# Patient Record
Sex: FEMALE | Race: OTHER | HISPANIC OR LATINO
[De-identification: names, ages, dates, MRNs, and addresses within clinical notes are randomized per-mention and may not be internally consistent; named-entity substitution may affect disease eponyms.]

---

## 2018-05-30 PROBLEM — Z00.00 ENCOUNTER FOR PREVENTIVE HEALTH EXAMINATION: Status: ACTIVE | Noted: 2018-05-30

## 2018-06-06 ENCOUNTER — LABORATORY RESULT (OUTPATIENT)
Age: 33
End: 2018-06-06

## 2018-06-07 ENCOUNTER — APPOINTMENT (OUTPATIENT)
Dept: OBGYN | Facility: CLINIC | Age: 33
End: 2018-06-07
Payer: COMMERCIAL

## 2018-06-07 VITALS
SYSTOLIC BLOOD PRESSURE: 110 MMHG | BODY MASS INDEX: 19.49 KG/M2 | WEIGHT: 110 LBS | HEIGHT: 63 IN | DIASTOLIC BLOOD PRESSURE: 80 MMHG

## 2018-06-07 DIAGNOSIS — Z30.09 ENCOUNTER FOR OTHER GENERAL COUNSELING AND ADVICE ON CONTRACEPTION: ICD-10-CM

## 2018-06-07 PROCEDURE — 99385 PREV VISIT NEW AGE 18-39: CPT

## 2018-06-07 RX ORDER — NORGESTIMATE AND ETHINYL ESTRADIOL 7DAYSX3 LO
0.18/0.215/0.25 KIT ORAL DAILY
Qty: 30 | Refills: 11 | Status: ACTIVE | COMMUNITY
Start: 2018-06-07 | End: 1900-01-01

## 2018-07-17 ENCOUNTER — ASOB RESULT (OUTPATIENT)
Age: 33
End: 2018-07-17

## 2018-07-17 ENCOUNTER — APPOINTMENT (OUTPATIENT)
Dept: OBGYN | Facility: CLINIC | Age: 33
End: 2018-07-17
Payer: COMMERCIAL

## 2018-07-17 ENCOUNTER — NON-APPOINTMENT (OUTPATIENT)
Age: 33
End: 2018-07-17

## 2018-07-17 VITALS — SYSTOLIC BLOOD PRESSURE: 100 MMHG | BODY MASS INDEX: 19.49 KG/M2 | WEIGHT: 110 LBS | DIASTOLIC BLOOD PRESSURE: 80 MMHG

## 2018-07-17 PROCEDURE — 99214 OFFICE O/P EST MOD 30 MIN: CPT

## 2018-07-17 PROCEDURE — 76801 OB US < 14 WKS SINGLE FETUS: CPT

## 2018-08-16 ENCOUNTER — APPOINTMENT (OUTPATIENT)
Dept: OBGYN | Facility: CLINIC | Age: 33
End: 2018-08-16
Payer: MEDICAID

## 2018-08-16 ENCOUNTER — ASOB RESULT (OUTPATIENT)
Age: 33
End: 2018-08-16

## 2018-08-16 ENCOUNTER — NON-APPOINTMENT (OUTPATIENT)
Age: 33
End: 2018-08-16

## 2018-08-16 VITALS
WEIGHT: 111 LBS | SYSTOLIC BLOOD PRESSURE: 110 MMHG | DIASTOLIC BLOOD PRESSURE: 80 MMHG | HEIGHT: 63 IN | BODY MASS INDEX: 19.67 KG/M2

## 2018-08-16 PROCEDURE — 99213 OFFICE O/P EST LOW 20 MIN: CPT

## 2018-08-16 PROCEDURE — 76801 OB US < 14 WKS SINGLE FETUS: CPT

## 2018-08-27 ENCOUNTER — APPOINTMENT (OUTPATIENT)
Dept: ANTEPARTUM | Facility: CLINIC | Age: 33
End: 2018-08-27
Payer: MEDICAID

## 2018-08-27 ENCOUNTER — ASOB RESULT (OUTPATIENT)
Age: 33
End: 2018-08-27

## 2018-08-27 PROCEDURE — 76813 OB US NUCHAL MEAS 1 GEST: CPT

## 2018-08-27 PROCEDURE — 76801 OB US < 14 WKS SINGLE FETUS: CPT

## 2018-08-27 PROCEDURE — 36416 COLLJ CAPILLARY BLOOD SPEC: CPT

## 2018-09-13 ENCOUNTER — APPOINTMENT (OUTPATIENT)
Dept: OBGYN | Facility: CLINIC | Age: 33
End: 2018-09-13
Payer: MEDICAID

## 2018-09-13 ENCOUNTER — NON-APPOINTMENT (OUTPATIENT)
Age: 33
End: 2018-09-13

## 2018-09-13 VITALS — WEIGHT: 113 LBS | SYSTOLIC BLOOD PRESSURE: 110 MMHG | BODY MASS INDEX: 20.02 KG/M2 | DIASTOLIC BLOOD PRESSURE: 70 MMHG

## 2018-09-13 PROCEDURE — 99213 OFFICE O/P EST LOW 20 MIN: CPT

## 2018-10-11 ENCOUNTER — NON-APPOINTMENT (OUTPATIENT)
Age: 33
End: 2018-10-11

## 2018-10-11 ENCOUNTER — APPOINTMENT (OUTPATIENT)
Dept: OBGYN | Facility: CLINIC | Age: 33
End: 2018-10-11
Payer: MEDICAID

## 2018-10-11 VITALS — WEIGHT: 114 LBS | DIASTOLIC BLOOD PRESSURE: 60 MMHG | BODY MASS INDEX: 20.19 KG/M2 | SYSTOLIC BLOOD PRESSURE: 100 MMHG

## 2018-10-11 PROCEDURE — 0502F SUBSEQUENT PRENATAL CARE: CPT

## 2018-10-22 ENCOUNTER — APPOINTMENT (OUTPATIENT)
Dept: ANTEPARTUM | Facility: CLINIC | Age: 33
End: 2018-10-22
Payer: MEDICAID

## 2018-10-22 ENCOUNTER — ASOB RESULT (OUTPATIENT)
Age: 33
End: 2018-10-22

## 2018-10-22 PROCEDURE — 76817 TRANSVAGINAL US OBSTETRIC: CPT

## 2018-10-22 PROCEDURE — 76811 OB US DETAILED SNGL FETUS: CPT

## 2018-11-08 ENCOUNTER — NON-APPOINTMENT (OUTPATIENT)
Age: 33
End: 2018-11-08

## 2018-11-08 ENCOUNTER — APPOINTMENT (OUTPATIENT)
Dept: OBGYN | Facility: CLINIC | Age: 33
End: 2018-11-08
Payer: MEDICAID

## 2018-11-08 VITALS — WEIGHT: 116 LBS | DIASTOLIC BLOOD PRESSURE: 70 MMHG | SYSTOLIC BLOOD PRESSURE: 112 MMHG | BODY MASS INDEX: 20.55 KG/M2

## 2018-11-08 PROCEDURE — 0502F SUBSEQUENT PRENATAL CARE: CPT

## 2018-12-03 ENCOUNTER — NON-APPOINTMENT (OUTPATIENT)
Age: 33
End: 2018-12-03

## 2018-12-03 ENCOUNTER — APPOINTMENT (OUTPATIENT)
Dept: OBGYN | Facility: CLINIC | Age: 33
End: 2018-12-03
Payer: MEDICAID

## 2018-12-03 VITALS
SYSTOLIC BLOOD PRESSURE: 120 MMHG | DIASTOLIC BLOOD PRESSURE: 80 MMHG | HEIGHT: 63 IN | BODY MASS INDEX: 21.44 KG/M2 | WEIGHT: 121 LBS

## 2018-12-03 PROCEDURE — 0502F SUBSEQUENT PRENATAL CARE: CPT

## 2018-12-17 ENCOUNTER — ASOB RESULT (OUTPATIENT)
Age: 33
End: 2018-12-17

## 2018-12-17 ENCOUNTER — APPOINTMENT (OUTPATIENT)
Dept: ANTEPARTUM | Facility: CLINIC | Age: 33
End: 2018-12-17
Payer: MEDICAID

## 2018-12-17 PROCEDURE — 76816 OB US FOLLOW-UP PER FETUS: CPT

## 2018-12-17 PROCEDURE — 76817 TRANSVAGINAL US OBSTETRIC: CPT

## 2018-12-28 ENCOUNTER — NON-APPOINTMENT (OUTPATIENT)
Age: 33
End: 2018-12-28

## 2018-12-28 ENCOUNTER — APPOINTMENT (OUTPATIENT)
Dept: OBGYN | Facility: CLINIC | Age: 33
End: 2018-12-28
Payer: MEDICAID

## 2018-12-28 VITALS — WEIGHT: 122 LBS | DIASTOLIC BLOOD PRESSURE: 70 MMHG | BODY MASS INDEX: 21.61 KG/M2 | SYSTOLIC BLOOD PRESSURE: 115 MMHG

## 2018-12-28 PROCEDURE — 0502F SUBSEQUENT PRENATAL CARE: CPT

## 2019-01-14 ENCOUNTER — ASOB RESULT (OUTPATIENT)
Age: 34
End: 2019-01-14

## 2019-01-14 ENCOUNTER — APPOINTMENT (OUTPATIENT)
Dept: OBGYN | Facility: CLINIC | Age: 34
End: 2019-01-14
Payer: MEDICAID

## 2019-01-14 ENCOUNTER — NON-APPOINTMENT (OUTPATIENT)
Age: 34
End: 2019-01-14

## 2019-01-14 VITALS
HEIGHT: 63 IN | BODY MASS INDEX: 21.79 KG/M2 | SYSTOLIC BLOOD PRESSURE: 90 MMHG | WEIGHT: 123 LBS | DIASTOLIC BLOOD PRESSURE: 60 MMHG

## 2019-01-14 PROCEDURE — 76816 OB US FOLLOW-UP PER FETUS: CPT

## 2019-01-14 PROCEDURE — 99213 OFFICE O/P EST LOW 20 MIN: CPT

## 2019-01-27 ENCOUNTER — LABORATORY RESULT (OUTPATIENT)
Age: 34
End: 2019-01-27

## 2019-01-28 ENCOUNTER — NON-APPOINTMENT (OUTPATIENT)
Age: 34
End: 2019-01-28

## 2019-01-28 ENCOUNTER — APPOINTMENT (OUTPATIENT)
Dept: OBGYN | Facility: CLINIC | Age: 34
End: 2019-01-28
Payer: MEDICAID

## 2019-01-28 VITALS
BODY MASS INDEX: 22.15 KG/M2 | WEIGHT: 125 LBS | SYSTOLIC BLOOD PRESSURE: 120 MMHG | DIASTOLIC BLOOD PRESSURE: 80 MMHG | HEIGHT: 63 IN

## 2019-01-28 PROCEDURE — 0502F SUBSEQUENT PRENATAL CARE: CPT

## 2019-02-12 ENCOUNTER — APPOINTMENT (OUTPATIENT)
Dept: OBGYN | Facility: CLINIC | Age: 34
End: 2019-02-12
Payer: MEDICAID

## 2019-02-12 ENCOUNTER — NON-APPOINTMENT (OUTPATIENT)
Age: 34
End: 2019-02-12

## 2019-02-12 VITALS
BODY MASS INDEX: 23.04 KG/M2 | SYSTOLIC BLOOD PRESSURE: 120 MMHG | HEIGHT: 63 IN | WEIGHT: 130 LBS | DIASTOLIC BLOOD PRESSURE: 80 MMHG

## 2019-02-12 PROCEDURE — 0502F SUBSEQUENT PRENATAL CARE: CPT

## 2019-02-19 ENCOUNTER — NON-APPOINTMENT (OUTPATIENT)
Age: 34
End: 2019-02-19

## 2019-02-19 ENCOUNTER — APPOINTMENT (OUTPATIENT)
Dept: OBGYN | Facility: CLINIC | Age: 34
End: 2019-02-19
Payer: MEDICAID

## 2019-02-19 VITALS
DIASTOLIC BLOOD PRESSURE: 80 MMHG | HEIGHT: 63 IN | SYSTOLIC BLOOD PRESSURE: 120 MMHG | BODY MASS INDEX: 23.04 KG/M2 | WEIGHT: 130 LBS

## 2019-02-19 PROCEDURE — 0502F SUBSEQUENT PRENATAL CARE: CPT

## 2019-03-01 ENCOUNTER — OUTPATIENT (OUTPATIENT)
Dept: OUTPATIENT SERVICES | Facility: HOSPITAL | Age: 34
LOS: 1 days | End: 2019-03-01
Payer: MEDICAID

## 2019-03-01 DIAGNOSIS — Z3A.00 WEEKS OF GESTATION OF PREGNANCY NOT SPECIFIED: ICD-10-CM

## 2019-03-01 DIAGNOSIS — O26.899 OTHER SPECIFIED PREGNANCY RELATED CONDITIONS, UNSPECIFIED TRIMESTER: ICD-10-CM

## 2019-03-01 PROCEDURE — 59025 FETAL NON-STRESS TEST: CPT

## 2019-03-01 PROCEDURE — G0463: CPT

## 2019-03-05 ENCOUNTER — APPOINTMENT (OUTPATIENT)
Dept: OBGYN | Facility: CLINIC | Age: 34
End: 2019-03-05
Payer: MEDICAID

## 2019-03-05 ENCOUNTER — NON-APPOINTMENT (OUTPATIENT)
Age: 34
End: 2019-03-05

## 2019-03-05 PROCEDURE — 0501F PRENATAL FLOW SHEET: CPT

## 2019-03-05 PROCEDURE — 0502F SUBSEQUENT PRENATAL CARE: CPT

## 2019-03-06 ENCOUNTER — FORM ENCOUNTER (OUTPATIENT)
Age: 34
End: 2019-03-06

## 2019-03-07 ENCOUNTER — OUTPATIENT (OUTPATIENT)
Dept: OUTPATIENT SERVICES | Facility: HOSPITAL | Age: 34
LOS: 1 days | End: 2019-03-07
Payer: MEDICAID

## 2019-03-07 DIAGNOSIS — Z3A.00 WEEKS OF GESTATION OF PREGNANCY NOT SPECIFIED: ICD-10-CM

## 2019-03-07 DIAGNOSIS — O26.899 OTHER SPECIFIED PREGNANCY RELATED CONDITIONS, UNSPECIFIED TRIMESTER: ICD-10-CM

## 2019-03-07 PROCEDURE — G0463: CPT

## 2019-03-07 PROCEDURE — 76818 FETAL BIOPHYS PROFILE W/NST: CPT | Mod: 26

## 2019-03-07 PROCEDURE — 76815 OB US LIMITED FETUS(S): CPT | Mod: 26

## 2019-03-07 PROCEDURE — 76815 OB US LIMITED FETUS(S): CPT

## 2019-03-07 PROCEDURE — 59025 FETAL NON-STRESS TEST: CPT

## 2019-03-07 PROCEDURE — 76818 FETAL BIOPHYS PROFILE W/NST: CPT

## 2019-03-11 ENCOUNTER — INPATIENT (INPATIENT)
Facility: HOSPITAL | Age: 34
LOS: 2 days | Discharge: HOME | End: 2019-03-14
Attending: OBSTETRICS & GYNECOLOGY | Admitting: OBSTETRICS & GYNECOLOGY
Payer: MEDICAID

## 2019-03-11 VITALS — HEIGHT: 64 IN | WEIGHT: 132.28 LBS

## 2019-03-11 DIAGNOSIS — Z3A.00 WEEKS OF GESTATION OF PREGNANCY NOT SPECIFIED: ICD-10-CM

## 2019-03-11 DIAGNOSIS — O26.899 OTHER SPECIFIED PREGNANCY RELATED CONDITIONS, UNSPECIFIED TRIMESTER: ICD-10-CM

## 2019-03-11 DIAGNOSIS — Z34.80 ENCOUNTER FOR SUPERVISION OF OTHER NORMAL PREGNANCY, UNSPECIFIED TRIMESTER: ICD-10-CM

## 2019-03-11 LAB
APTT BLD: 28.4 SEC — SIGNIFICANT CHANGE UP (ref 27.5–36.3)
BASOPHILS # BLD AUTO: 0.03 K/UL — SIGNIFICANT CHANGE UP (ref 0–0.2)
BASOPHILS NFR BLD AUTO: 0.3 % — SIGNIFICANT CHANGE UP (ref 0–2)
EOSINOPHIL # BLD AUTO: 0.12 K/UL — SIGNIFICANT CHANGE UP (ref 0–0.5)
EOSINOPHIL NFR BLD AUTO: 1.1 % — SIGNIFICANT CHANGE UP (ref 0–6)
FIBRINOGEN PPP-MCNC: 546 MG/DL — HIGH (ref 350–510)
HCT VFR BLD CALC: 40.4 % — SIGNIFICANT CHANGE UP (ref 34.5–45)
HGB BLD-MCNC: 13.1 G/DL — SIGNIFICANT CHANGE UP (ref 11.5–15.5)
IMM GRANULOCYTES NFR BLD AUTO: 0.6 % — SIGNIFICANT CHANGE UP (ref 0–1.5)
INR BLD: 0.97 RATIO — SIGNIFICANT CHANGE UP (ref 0.88–1.16)
LYMPHOCYTES # BLD AUTO: 1.72 K/UL — SIGNIFICANT CHANGE UP (ref 1–3.3)
LYMPHOCYTES # BLD AUTO: 15.9 % — SIGNIFICANT CHANGE UP (ref 13–44)
MCHC RBC-ENTMCNC: 32 PG — SIGNIFICANT CHANGE UP (ref 27–34)
MCHC RBC-ENTMCNC: 32.4 GM/DL — SIGNIFICANT CHANGE UP (ref 32–36)
MCV RBC AUTO: 98.8 FL — SIGNIFICANT CHANGE UP (ref 80–100)
MONOCYTES # BLD AUTO: 0.63 K/UL — SIGNIFICANT CHANGE UP (ref 0–0.9)
MONOCYTES NFR BLD AUTO: 5.8 % — SIGNIFICANT CHANGE UP (ref 2–14)
NEUTROPHILS # BLD AUTO: 8.23 K/UL — HIGH (ref 1.8–7.4)
NEUTROPHILS NFR BLD AUTO: 76.3 % — SIGNIFICANT CHANGE UP (ref 43–77)
NRBC # BLD: 0 /100 WBCS — SIGNIFICANT CHANGE UP (ref 0–0)
PLATELET # BLD AUTO: 203 K/UL — SIGNIFICANT CHANGE UP (ref 150–400)
PROTHROM AB SERPL-ACNC: 10.8 SEC — SIGNIFICANT CHANGE UP (ref 10–12.9)
RBC # BLD: 4.09 M/UL — SIGNIFICANT CHANGE UP (ref 3.8–5.2)
RBC # FLD: 14.2 % — SIGNIFICANT CHANGE UP (ref 10.3–14.5)
WBC # BLD: 10.8 K/UL — HIGH (ref 3.8–10.5)
WBC # FLD AUTO: 10.8 K/UL — HIGH (ref 3.8–10.5)

## 2019-03-11 PROCEDURE — 59400 OBSTETRICAL CARE: CPT | Mod: U9

## 2019-03-11 PROCEDURE — 76818 FETAL BIOPHYS PROFILE W/NST: CPT | Mod: 26

## 2019-03-11 RX ORDER — SODIUM CHLORIDE 9 MG/ML
1000 INJECTION, SOLUTION INTRAVENOUS ONCE
Qty: 0 | Refills: 0 | Status: DISCONTINUED | OUTPATIENT
Start: 2019-03-11 | End: 2019-03-12

## 2019-03-11 RX ORDER — CITRIC ACID/SODIUM CITRATE 300-500 MG
15 SOLUTION, ORAL ORAL EVERY 4 HOURS
Qty: 0 | Refills: 0 | Status: DISCONTINUED | OUTPATIENT
Start: 2019-03-11 | End: 2019-03-12

## 2019-03-11 RX ORDER — SODIUM CHLORIDE 9 MG/ML
1000 INJECTION, SOLUTION INTRAVENOUS
Qty: 0 | Refills: 0 | Status: DISCONTINUED | OUTPATIENT
Start: 2019-03-11 | End: 2019-03-12

## 2019-03-11 RX ORDER — OXYTOCIN 10 UNIT/ML
333.33 VIAL (ML) INJECTION
Qty: 20 | Refills: 0 | Status: DISCONTINUED | OUTPATIENT
Start: 2019-03-11 | End: 2019-03-12

## 2019-03-11 RX ORDER — ONDANSETRON 8 MG/1
4 TABLET, FILM COATED ORAL EVERY 6 HOURS
Qty: 0 | Refills: 0 | Status: DISCONTINUED | OUTPATIENT
Start: 2019-03-11 | End: 2019-03-12

## 2019-03-11 RX ORDER — SODIUM CHLORIDE 9 MG/ML
500 INJECTION INTRAMUSCULAR; INTRAVENOUS; SUBCUTANEOUS ONCE
Qty: 0 | Refills: 0 | Status: DISCONTINUED | OUTPATIENT
Start: 2019-03-11 | End: 2019-03-14

## 2019-03-11 RX ADMIN — SODIUM CHLORIDE 125 MILLILITER(S): 9 INJECTION, SOLUTION INTRAVENOUS at 20:59

## 2019-03-12 ENCOUNTER — RESULT REVIEW (OUTPATIENT)
Age: 34
End: 2019-03-12

## 2019-03-12 LAB
BASOPHILS # BLD AUTO: 0.04 K/UL — SIGNIFICANT CHANGE UP (ref 0–0.2)
BASOPHILS NFR BLD AUTO: 0.2 % — SIGNIFICANT CHANGE UP (ref 0–2)
EOSINOPHIL # BLD AUTO: 0.03 K/UL — SIGNIFICANT CHANGE UP (ref 0–0.5)
EOSINOPHIL NFR BLD AUTO: 0.2 % — SIGNIFICANT CHANGE UP (ref 0–6)
HCT VFR BLD CALC: 33 % — LOW (ref 34.5–45)
HGB BLD-MCNC: 10.7 G/DL — LOW (ref 11.5–15.5)
IMM GRANULOCYTES NFR BLD AUTO: 0.6 % — SIGNIFICANT CHANGE UP (ref 0–1.5)
LYMPHOCYTES # BLD AUTO: 1.26 K/UL — SIGNIFICANT CHANGE UP (ref 1–3.3)
LYMPHOCYTES # BLD AUTO: 7.5 % — LOW (ref 13–44)
MCHC RBC-ENTMCNC: 32 PG — SIGNIFICANT CHANGE UP (ref 27–34)
MCHC RBC-ENTMCNC: 32.4 GM/DL — SIGNIFICANT CHANGE UP (ref 32–36)
MCV RBC AUTO: 98.8 FL — SIGNIFICANT CHANGE UP (ref 80–100)
MONOCYTES # BLD AUTO: 0.54 K/UL — SIGNIFICANT CHANGE UP (ref 0–0.9)
MONOCYTES NFR BLD AUTO: 3.2 % — SIGNIFICANT CHANGE UP (ref 2–14)
NEUTROPHILS # BLD AUTO: 14.84 K/UL — HIGH (ref 1.8–7.4)
NEUTROPHILS NFR BLD AUTO: 88.3 % — HIGH (ref 43–77)
NRBC # BLD: 0 /100 WBCS — SIGNIFICANT CHANGE UP (ref 0–0)
PLATELET # BLD AUTO: 147 K/UL — LOW (ref 150–400)
RBC # BLD: 3.34 M/UL — LOW (ref 3.8–5.2)
RBC # FLD: 14.1 % — SIGNIFICANT CHANGE UP (ref 10.3–14.5)
WBC # BLD: 16.81 K/UL — HIGH (ref 3.8–10.5)
WBC # FLD AUTO: 16.81 K/UL — HIGH (ref 3.8–10.5)

## 2019-03-12 RX ORDER — GENTAMICIN SULFATE 40 MG/ML
VIAL (ML) INJECTION
Qty: 0 | Refills: 0 | Status: DISCONTINUED | OUTPATIENT
Start: 2019-03-12 | End: 2019-03-12

## 2019-03-12 RX ORDER — GENTAMICIN SULFATE 40 MG/ML
120 VIAL (ML) INJECTION ONCE
Qty: 0 | Refills: 0 | Status: DISCONTINUED | OUTPATIENT
Start: 2019-03-12 | End: 2019-03-12

## 2019-03-12 RX ORDER — PRAMOXINE HYDROCHLORIDE 150 MG/15G
1 AEROSOL, FOAM RECTAL EVERY 4 HOURS
Qty: 0 | Refills: 0 | Status: DISCONTINUED | OUTPATIENT
Start: 2019-03-12 | End: 2019-03-14

## 2019-03-12 RX ORDER — GENTAMICIN SULFATE 40 MG/ML
80 VIAL (ML) INJECTION EVERY 8 HOURS
Qty: 0 | Refills: 0 | Status: DISCONTINUED | OUTPATIENT
Start: 2019-03-12 | End: 2019-03-12

## 2019-03-12 RX ORDER — LANOLIN
1 OINTMENT (GRAM) TOPICAL EVERY 6 HOURS
Qty: 0 | Refills: 0 | Status: DISCONTINUED | OUTPATIENT
Start: 2019-03-12 | End: 2019-03-14

## 2019-03-12 RX ORDER — OXYTOCIN 10 UNIT/ML
2 VIAL (ML) INJECTION
Qty: 30 | Refills: 0 | Status: DISCONTINUED | OUTPATIENT
Start: 2019-03-12 | End: 2019-03-14

## 2019-03-12 RX ORDER — AMPICILLIN TRIHYDRATE 250 MG
CAPSULE ORAL
Qty: 0 | Refills: 0 | Status: DISCONTINUED | OUTPATIENT
Start: 2019-03-12 | End: 2019-03-12

## 2019-03-12 RX ORDER — DIBUCAINE 1 %
1 OINTMENT (GRAM) RECTAL EVERY 4 HOURS
Qty: 0 | Refills: 0 | Status: DISCONTINUED | OUTPATIENT
Start: 2019-03-12 | End: 2019-03-14

## 2019-03-12 RX ORDER — OXYCODONE AND ACETAMINOPHEN 5; 325 MG/1; MG/1
2 TABLET ORAL EVERY 6 HOURS
Qty: 0 | Refills: 0 | Status: DISCONTINUED | OUTPATIENT
Start: 2019-03-12 | End: 2019-03-14

## 2019-03-12 RX ORDER — OXYTOCIN 10 UNIT/ML
41.67 VIAL (ML) INJECTION
Qty: 20 | Refills: 0 | Status: DISCONTINUED | OUTPATIENT
Start: 2019-03-12 | End: 2019-03-14

## 2019-03-12 RX ORDER — AMPICILLIN TRIHYDRATE 250 MG
2 CAPSULE ORAL ONCE
Qty: 0 | Refills: 0 | Status: DISCONTINUED | OUTPATIENT
Start: 2019-03-12 | End: 2019-03-12

## 2019-03-12 RX ORDER — SODIUM CHLORIDE 9 MG/ML
3 INJECTION INTRAMUSCULAR; INTRAVENOUS; SUBCUTANEOUS EVERY 8 HOURS
Qty: 0 | Refills: 0 | Status: DISCONTINUED | OUTPATIENT
Start: 2019-03-12 | End: 2019-03-14

## 2019-03-12 RX ORDER — DIPHENHYDRAMINE HCL 50 MG
25 CAPSULE ORAL EVERY 6 HOURS
Qty: 0 | Refills: 0 | Status: DISCONTINUED | OUTPATIENT
Start: 2019-03-12 | End: 2019-03-14

## 2019-03-12 RX ORDER — HYDROCORTISONE 1 %
1 OINTMENT (GRAM) TOPICAL EVERY 4 HOURS
Qty: 0 | Refills: 0 | Status: DISCONTINUED | OUTPATIENT
Start: 2019-03-12 | End: 2019-03-14

## 2019-03-12 RX ORDER — ZOLPIDEM TARTRATE 10 MG/1
5 TABLET ORAL AT BEDTIME
Qty: 0 | Refills: 0 | Status: DISCONTINUED | OUTPATIENT
Start: 2019-03-12 | End: 2019-03-14

## 2019-03-12 RX ORDER — BENZOCAINE 10 %
1 GEL (GRAM) MUCOUS MEMBRANE EVERY 6 HOURS
Qty: 0 | Refills: 0 | Status: DISCONTINUED | OUTPATIENT
Start: 2019-03-12 | End: 2019-03-14

## 2019-03-12 RX ORDER — GENTAMICIN SULFATE 40 MG/ML
80 VIAL (ML) INJECTION ONCE
Qty: 0 | Refills: 0 | Status: COMPLETED | OUTPATIENT
Start: 2019-03-12 | End: 2019-03-12

## 2019-03-12 RX ORDER — FOLIC ACID 0.8 MG
1 TABLET ORAL DAILY
Qty: 0 | Refills: 0 | Status: DISCONTINUED | OUTPATIENT
Start: 2019-03-12 | End: 2019-03-14

## 2019-03-12 RX ORDER — ACETAMINOPHEN 500 MG
1000 TABLET ORAL ONCE
Qty: 0 | Refills: 0 | Status: COMPLETED | OUTPATIENT
Start: 2019-03-12 | End: 2019-03-12

## 2019-03-12 RX ORDER — ACETAMINOPHEN 500 MG
650 TABLET ORAL EVERY 4 HOURS
Qty: 0 | Refills: 0 | Status: DISCONTINUED | OUTPATIENT
Start: 2019-03-12 | End: 2019-03-14

## 2019-03-12 RX ORDER — FERROUS SULFATE 325(65) MG
325 TABLET ORAL DAILY
Qty: 0 | Refills: 0 | Status: DISCONTINUED | OUTPATIENT
Start: 2019-03-12 | End: 2019-03-14

## 2019-03-12 RX ORDER — TETANUS TOXOID, REDUCED DIPHTHERIA TOXOID AND ACELLULAR PERTUSSIS VACCINE, ADSORBED 5; 2.5; 8; 8; 2.5 [IU]/.5ML; [IU]/.5ML; UG/.5ML; UG/.5ML; UG/.5ML
0.5 SUSPENSION INTRAMUSCULAR ONCE
Qty: 0 | Refills: 0 | Status: DISCONTINUED | OUTPATIENT
Start: 2019-03-12 | End: 2019-03-14

## 2019-03-12 RX ORDER — DOCUSATE SODIUM 100 MG
100 CAPSULE ORAL
Qty: 0 | Refills: 0 | Status: DISCONTINUED | OUTPATIENT
Start: 2019-03-12 | End: 2019-03-13

## 2019-03-12 RX ORDER — SODIUM CHLORIDE 9 MG/ML
1000 INJECTION, SOLUTION INTRAVENOUS
Qty: 0 | Refills: 0 | Status: DISCONTINUED | OUTPATIENT
Start: 2019-03-12 | End: 2019-03-14

## 2019-03-12 RX ORDER — AMPICILLIN TRIHYDRATE 250 MG
2 CAPSULE ORAL EVERY 6 HOURS
Qty: 0 | Refills: 0 | Status: DISCONTINUED | OUTPATIENT
Start: 2019-03-12 | End: 2019-03-12

## 2019-03-12 RX ORDER — SIMETHICONE 80 MG/1
80 TABLET, CHEWABLE ORAL EVERY 6 HOURS
Qty: 0 | Refills: 0 | Status: DISCONTINUED | OUTPATIENT
Start: 2019-03-12 | End: 2019-03-14

## 2019-03-12 RX ORDER — IBUPROFEN 200 MG
600 TABLET ORAL EVERY 6 HOURS
Qty: 0 | Refills: 0 | Status: DISCONTINUED | OUTPATIENT
Start: 2019-03-12 | End: 2019-03-14

## 2019-03-12 RX ORDER — GENTAMICIN SULFATE 40 MG/ML
80 VIAL (ML) INJECTION EVERY 8 HOURS
Qty: 0 | Refills: 0 | Status: COMPLETED | OUTPATIENT
Start: 2019-03-12 | End: 2019-03-12

## 2019-03-12 RX ADMIN — SODIUM CHLORIDE 125 MILLILITER(S): 9 INJECTION, SOLUTION INTRAVENOUS at 03:51

## 2019-03-12 RX ADMIN — Medication 125 MILLIUNIT(S)/MIN: at 09:45

## 2019-03-12 RX ADMIN — Medication 650 MILLIGRAM(S): at 21:20

## 2019-03-12 RX ADMIN — Medication 2 MILLIUNIT(S)/MIN: at 07:45

## 2019-03-12 RX ADMIN — Medication 216 GRAM(S): at 08:53

## 2019-03-12 RX ADMIN — Medication 325 MILLIGRAM(S): at 12:22

## 2019-03-12 RX ADMIN — Medication 400 MILLIGRAM(S): at 08:38

## 2019-03-12 RX ADMIN — Medication 1 MILLIGRAM(S): at 12:24

## 2019-03-12 RX ADMIN — Medication 100 MILLIGRAM(S): at 11:07

## 2019-03-12 RX ADMIN — Medication 1000 MILLIGRAM(S): at 09:30

## 2019-03-12 RX ADMIN — Medication 204 MILLIGRAM(S): at 09:45

## 2019-03-12 RX ADMIN — Medication 1 TABLET(S): at 12:23

## 2019-03-12 RX ADMIN — Medication 204 MILLIGRAM(S): at 16:49

## 2019-03-12 RX ADMIN — Medication 650 MILLIGRAM(S): at 21:00

## 2019-03-12 RX ADMIN — SODIUM CHLORIDE 3 MILLILITER(S): 9 INJECTION INTRAMUSCULAR; INTRAVENOUS; SUBCUTANEOUS at 14:05

## 2019-03-12 RX ADMIN — Medication 1 SPRAY(S): at 16:48

## 2019-03-12 RX ADMIN — Medication 125 MILLIUNIT(S)/MIN: at 11:00

## 2019-03-13 ENCOUNTER — TRANSCRIPTION ENCOUNTER (OUTPATIENT)
Age: 34
End: 2019-03-13

## 2019-03-13 LAB — T PALLIDUM AB TITR SER: NEGATIVE — SIGNIFICANT CHANGE UP

## 2019-03-13 RX ORDER — DOCUSATE SODIUM 100 MG
100 CAPSULE ORAL
Qty: 0 | Refills: 0 | Status: DISCONTINUED | OUTPATIENT
Start: 2019-03-13 | End: 2019-03-14

## 2019-03-13 RX ADMIN — Medication 100 MILLIGRAM(S): at 18:24

## 2019-03-13 RX ADMIN — SODIUM CHLORIDE 3 MILLILITER(S): 9 INJECTION INTRAMUSCULAR; INTRAVENOUS; SUBCUTANEOUS at 01:42

## 2019-03-13 RX ADMIN — Medication 1 MILLIGRAM(S): at 12:47

## 2019-03-13 RX ADMIN — SODIUM CHLORIDE 3 MILLILITER(S): 9 INJECTION INTRAMUSCULAR; INTRAVENOUS; SUBCUTANEOUS at 18:23

## 2019-03-13 RX ADMIN — Medication 100 MILLIGRAM(S): at 06:30

## 2019-03-13 RX ADMIN — SODIUM CHLORIDE 3 MILLILITER(S): 9 INJECTION INTRAMUSCULAR; INTRAVENOUS; SUBCUTANEOUS at 06:28

## 2019-03-13 RX ADMIN — Medication 325 MILLIGRAM(S): at 12:47

## 2019-03-13 RX ADMIN — Medication 1 TABLET(S): at 12:47

## 2019-03-13 RX ADMIN — Medication 600 MILLIGRAM(S): at 06:29

## 2019-03-13 NOTE — DISCHARGE NOTE OB - MATERIALS PROVIDED
Back To Sleep Handout/Breastfeeding Guide and Packet/Shaken Baby Prevention Handout/Breastfeeding Mother’s Support Group Information/Guide to Postpartum Care/Birth Certificate Instructions/[facility] Screening Program/Vaccinations/  Immunization Record

## 2019-03-13 NOTE — DISCHARGE NOTE OB - MEDICATION SUMMARY - MEDICATIONS TO TAKE
I will START or STAY ON the medications listed below when I get home from the hospital:    ibuprofen 400 mg oral tablet  -- 1 tab(s) by mouth every 6 hours  -- Indication: For  (normal spontaneous vaginal delivery)    ferrous sulfate 325 mg (65 mg elemental iron) oral tablet  -- 1 tab(s) by mouth 3 times a day  -- Indication: For  (normal spontaneous vaginal delivery)    Colace 100 mg oral capsule  -- 1 cap(s) by mouth 2 times a day  -- Indication: For  (normal spontaneous vaginal delivery)

## 2019-03-13 NOTE — DISCHARGE NOTE OB - PLAN OF CARE
ob check outpatient in 5weeks call the office and set up appointment no sex nothing in vagina no heavy lifting no pushing no straining no strenuous activities  pain medication as needed; stool softener; dulcolax as needed if constipated  walk for exercise: helps recovery   continue prenatal vitamins daily especially whole course of breastfeeding  see your OB in the office for follow up post partum check 4-5weeks call the office to set up an appointment iv antibiotics given

## 2019-03-13 NOTE — PROGRESS NOTE ADULT - SUBJECTIVE AND OBJECTIVE BOX
Patient seen at bedside resting comfortably offers no current complaints. Ambulating and voiding without difficulty.  Passing flatus and tolerating regular diet.  both breast/bottle feeding . Denies HA, CP, SOB, N/V/D, dizziness, palpitations, worsening abdominal pain, worsening vaginal bleeding, or any other concerns.    Vital Signs Last 24 Hrs  T(C): 36.6 (13 Mar 2019 06:00), Max: 37.2 (12 Mar 2019 10:07)  T(F): 97.9 (13 Mar 2019 06:00), Max: 99 (12 Mar 2019 10:07)  HR: 82 (13 Mar 2019 06:00) (72 - 106)  BP: 94/58 (13 Mar 2019 06:00) (82/48 - 105/64)  BP(mean): 82 (13 Mar 2019 06:00) (62 - 106)  RR: 16 (13 Mar 2019 06:00) (16 - 20)  SpO2: 98% (13 Mar 2019 06:00) (97% - 99%)    Physical Exam:     Gen: A&Ox 3, NAD  Chest: CTA B/L  Cardiac: S1+S2; RRR  Breast: Soft, nontender, nonengorged  Abdomen: +Bs; Soft, nontender,  ND; Fundus firm below umbilicus  Gyn: mod lochia, intact/repaired  Ext: Nontender, DTRS 2+, no worsening edema                          10.7   16.81 )-----------( 147      ( 12 Mar 2019 18:16 )             33.0       A/P: 33 year old PPD#1 s/p     -Continue pain management  -Encourage OOB and ambulation  -Continue current care  -Plan for discharge tomorrow  -d/w dr. Santos

## 2019-03-13 NOTE — DISCHARGE NOTE OB - CARE PLAN
Principal Discharge DX:	 (normal spontaneous vaginal delivery)  Goal:	ob check outpatient in 5weeks call the office and set up appointment  Assessment and plan of treatment:	no sex nothing in vagina no heavy lifting no pushing no straining no strenuous activities  pain medication as needed; stool softener; dulcolax as needed if constipated  walk for exercise: helps recovery   continue prenatal vitamins daily especially whole course of breastfeeding  see your OB in the office for follow up post partum check 4-5weeks call the office to set up an appointment  Secondary Diagnosis:	Chorioamnionitis  Goal:	iv antibiotics given

## 2019-03-13 NOTE — DISCHARGE NOTE OB - ADDITIONAL INSTRUCTIONS
ob check outpatient in 5weeks call the office and set up appointment no sex, nothing in the vagina for 6 weeks, no strenuous activity, continue prenatal vitamins while breastfeeding, motrin prn for pain, f/u in the office in 5-6 weeks for postpartum visit

## 2019-03-13 NOTE — DISCHARGE NOTE OB - PATIENT PORTAL LINK FT
You can access the [facility] Patient Portal, offered by [facility], by registering with the following website: [email or web address]

## 2019-03-13 NOTE — PROGRESS NOTE ADULT - NSICDXPROBLEM_GEN_ALL_CORE_FT
PROBLEM DIAGNOSES  Problem:  (normal spontaneous vaginal delivery)  Assessment and Plan: -Continue pain management  -Encourage OOB and ambulation  -Continue current care  -Plan for discharge tomorrow  -d/w dr. Santos

## 2019-03-13 NOTE — DISCHARGE NOTE OB - CARE PROVIDER_API CALL
Alton Santos)  Obstetrics and Gynecology  [address], [place] [id number]  Phone: [phone]  Fax: [phone]  Follow Up Time: 1 month

## 2019-03-14 VITALS
OXYGEN SATURATION: 99 % | DIASTOLIC BLOOD PRESSURE: 72 MMHG | HEART RATE: 64 BPM | RESPIRATION RATE: 16 BRPM | TEMPERATURE: 98 F | SYSTOLIC BLOOD PRESSURE: 109 MMHG

## 2019-03-14 PROCEDURE — 86780 TREPONEMA PALLIDUM: CPT

## 2019-03-14 PROCEDURE — 85384 FIBRINOGEN ACTIVITY: CPT

## 2019-03-14 PROCEDURE — 86850 RBC ANTIBODY SCREEN: CPT

## 2019-03-14 PROCEDURE — 85027 COMPLETE CBC AUTOMATED: CPT

## 2019-03-14 PROCEDURE — 76818 FETAL BIOPHYS PROFILE W/NST: CPT

## 2019-03-14 PROCEDURE — 85730 THROMBOPLASTIN TIME PARTIAL: CPT

## 2019-03-14 PROCEDURE — 86901 BLOOD TYPING SEROLOGIC RH(D): CPT

## 2019-03-14 PROCEDURE — 36415 COLL VENOUS BLD VENIPUNCTURE: CPT

## 2019-03-14 PROCEDURE — 86900 BLOOD TYPING SEROLOGIC ABO: CPT

## 2019-03-14 PROCEDURE — G0463: CPT

## 2019-03-14 PROCEDURE — 85610 PROTHROMBIN TIME: CPT

## 2019-03-14 PROCEDURE — 59025 FETAL NON-STRESS TEST: CPT

## 2019-03-14 PROCEDURE — 59050 FETAL MONITOR W/REPORT: CPT

## 2019-03-14 RX ADMIN — Medication 600 MILLIGRAM(S): at 07:30

## 2019-03-14 RX ADMIN — Medication 1 TABLET(S): at 11:14

## 2019-03-14 RX ADMIN — SODIUM CHLORIDE 3 MILLILITER(S): 9 INJECTION INTRAMUSCULAR; INTRAVENOUS; SUBCUTANEOUS at 02:01

## 2019-03-14 RX ADMIN — Medication 325 MILLIGRAM(S): at 11:14

## 2019-03-14 RX ADMIN — Medication 1 MILLIGRAM(S): at 11:14

## 2019-03-14 NOTE — PROGRESS NOTE ADULT - ASSESSMENT
A/P: 32y/o PPD#2 s/p @40 weeks 6 days,stable    -Discharge home with instructions  -Follow up in office in 5-6 weeks for postpartum visit  -Breastfeeding encouraged   -d/w Dr. Santos

## 2019-03-14 NOTE — PROGRESS NOTE ADULT - NSICDXPROBLEM_GEN_ALL_CORE_FT
PROBLEM DIAGNOSES  Problem:  (normal spontaneous vaginal delivery)  Assessment and Plan: PPD#2 stable for discharge  instructions given

## 2019-03-14 NOTE — PROGRESS NOTE ADULT - SUBJECTIVE AND OBJECTIVE BOX
OB PA Progress Note PPD#2  Dr. Alexandrea tim attending at bedside    Patient seen at bedside resting comfortably offers no current complaints.  Ambulating and voiding without difficulty.  Passing flatus and tolerating regular diet.  both breast/bottle feeding.  Denies HA, CP, SOB, N/V/D, dizziness, palpitations, worsening abdominal pain, worsening vaginal bleeding, or any other concerns.      Vital Signs Last 24 Hrs  T(C): 36.7 (14 Mar 2019 06:08), Max: 36.7 (14 Mar 2019 06:08)  T(F): 98 (14 Mar 2019 06:08), Max: 98 (14 Mar 2019 06:08)  HR: 64 (14 Mar 2019 06:08) (64 - 84)  BP: 109/72 (14 Mar 2019 06:08) (100/66 - 109/72)  BP(mean): --  RR: 16 (14 Mar 2019 06:08) (16 - 16)  SpO2: 99% (14 Mar 2019 06:08) (98% - 99%)    Physical Exam:     Gen: A&Ox 3, NAD  Chest: CTA B/L  Cardiac: S1,S2; RRR  Breast: Soft, nontender, nonengorged  Abdomen: +BS; Soft, nontender, ND; Fundus firm below umbilicus  Gyn: Min lochia  Ext: Nontender, no worsening edema

## 2019-03-19 RX ORDER — DOCUSATE SODIUM 100 MG/1
100 CAPSULE ORAL 3 TIMES DAILY
Qty: 2 | Refills: 2 | Status: ACTIVE | COMMUNITY
Start: 2019-03-19 | End: 1900-01-01

## 2019-03-19 RX ORDER — CHLORHEXIDINE GLUCONATE 4 %
325 (65 FE) LIQUID (ML) TOPICAL DAILY
Qty: 30 | Refills: 4 | Status: ACTIVE | COMMUNITY
Start: 2019-03-19 | End: 1900-01-01

## 2019-03-19 RX ORDER — IBUPROFEN 600 MG/1
600 TABLET, FILM COATED ORAL 3 TIMES DAILY
Qty: 30 | Refills: 0 | Status: ACTIVE | COMMUNITY
Start: 2019-03-19 | End: 1900-01-01

## 2019-04-25 ENCOUNTER — APPOINTMENT (OUTPATIENT)
Dept: OBGYN | Facility: CLINIC | Age: 34
End: 2019-04-25

## 2019-04-29 ENCOUNTER — APPOINTMENT (OUTPATIENT)
Dept: OBGYN | Facility: CLINIC | Age: 34
End: 2019-04-29

## 2019-05-01 ENCOUNTER — APPOINTMENT (OUTPATIENT)
Dept: OBGYN | Facility: CLINIC | Age: 34
End: 2019-05-01
Payer: MEDICAID

## 2019-05-01 VITALS — WEIGHT: 107 LBS | DIASTOLIC BLOOD PRESSURE: 70 MMHG | BODY MASS INDEX: 18.95 KG/M2 | SYSTOLIC BLOOD PRESSURE: 122 MMHG

## 2019-05-01 PROCEDURE — 0503F POSTPARTUM CARE VISIT: CPT

## 2019-05-01 RX ORDER — NORETHINDRONE 0.35 MG/1
0.35 TABLET ORAL DAILY
Qty: 1 | Refills: 6 | Status: ACTIVE | COMMUNITY
Start: 2019-05-01 | End: 1900-01-01

## 2019-05-01 NOTE — HISTORY OF PRESENT ILLNESS
[Complications:___] : no complications [] : delivered by vaginal delivery [Breastfeeding] : currently nursing [Female] : Delivery History: baby girl [Back to Normal] : is back to normal in size [None] : no vaginal bleeding [Normal] : the vagina was normal [Healing Well] : is healing well [Not Done] : Examination of breasts not done [Doing Well] : is doing well [Cervix Sample Taken] : cervical sample not taken for a Pap smear [Excellent Pain Control] : has excellent pain control [No Sign of Infection] : is showing no signs of infection [FreeTextEntry8] : s/p  here for 6 week postpartum visit.  No complaints. + breastfeeding, no pain, no discharge, no bleeding.  [de-identified] : S/p   [de-identified] : Requesting OCPs, Precautions reviewed.  RTO in 3 months for annual.

## 2019-08-22 ENCOUNTER — LABORATORY RESULT (OUTPATIENT)
Age: 34
End: 2019-08-22

## 2019-08-23 ENCOUNTER — APPOINTMENT (OUTPATIENT)
Dept: OBGYN | Facility: CLINIC | Age: 34
End: 2019-08-23
Payer: MEDICAID

## 2019-08-23 VITALS — BODY MASS INDEX: 20.02 KG/M2 | SYSTOLIC BLOOD PRESSURE: 120 MMHG | DIASTOLIC BLOOD PRESSURE: 70 MMHG | WEIGHT: 113 LBS

## 2019-08-23 PROCEDURE — 99395 PREV VISIT EST AGE 18-39: CPT

## 2019-09-10 NOTE — COUNSELING
[Breast Self Exam] : breast self exam [Exercise] : exercise [Nutrition] : nutrition [STD (testing, results, tx)] : STD (testing, results, tx) [Vitamins/Supplements] : vitamins/supplements [Contraception] : contraception

## 2020-03-30 NOTE — DISCHARGE NOTE OB - CAREGIVER NAME
Last Office Visit   1/31/2020  Upcoming: Visit date not found    Last Refill  pseudoephedrine (SUDAFED 12 HOUR) 120 MG 12 hr tablet 60 tablet 2 11/25/2019     Sig - Route: Take 1 tablet by mouth every 12 hours. - Oral    Sent to pharmacy as: Pseudoephedrine HCl  MG Oral Tablet Extended Release 12 Hour        No Protocol  Medication has been pended for provider review.    GLENDA MCKEON

## 2020-04-15 ENCOUNTER — LABORATORY RESULT (OUTPATIENT)
Age: 35
End: 2020-04-15

## 2020-04-15 ENCOUNTER — NON-APPOINTMENT (OUTPATIENT)
Age: 35
End: 2020-04-15

## 2020-04-15 ENCOUNTER — APPOINTMENT (OUTPATIENT)
Dept: OBGYN | Facility: CLINIC | Age: 35
End: 2020-04-15
Payer: MEDICAID

## 2020-04-15 ENCOUNTER — ASOB RESULT (OUTPATIENT)
Age: 35
End: 2020-04-15

## 2020-04-15 VITALS — WEIGHT: 108 LBS | SYSTOLIC BLOOD PRESSURE: 108 MMHG | BODY MASS INDEX: 19.13 KG/M2 | DIASTOLIC BLOOD PRESSURE: 72 MMHG

## 2020-04-15 PROCEDURE — 76801 OB US < 14 WKS SINGLE FETUS: CPT

## 2020-04-15 PROCEDURE — 99213 OFFICE O/P EST LOW 20 MIN: CPT

## 2020-05-04 ENCOUNTER — APPOINTMENT (OUTPATIENT)
Dept: ANTEPARTUM | Facility: CLINIC | Age: 35
End: 2020-05-04
Payer: MEDICAID

## 2020-05-04 ENCOUNTER — ASOB RESULT (OUTPATIENT)
Age: 35
End: 2020-05-04

## 2020-05-04 PROCEDURE — 76813 OB US NUCHAL MEAS 1 GEST: CPT

## 2020-05-04 PROCEDURE — 76801 OB US < 14 WKS SINGLE FETUS: CPT

## 2020-05-12 ENCOUNTER — APPOINTMENT (OUTPATIENT)
Dept: OBGYN | Facility: CLINIC | Age: 35
End: 2020-05-12
Payer: MEDICAID

## 2020-05-12 ENCOUNTER — NON-APPOINTMENT (OUTPATIENT)
Age: 35
End: 2020-05-12

## 2020-05-12 VITALS — DIASTOLIC BLOOD PRESSURE: 82 MMHG | SYSTOLIC BLOOD PRESSURE: 112 MMHG | WEIGHT: 110 LBS | BODY MASS INDEX: 19.49 KG/M2

## 2020-05-12 PROCEDURE — 99213 OFFICE O/P EST LOW 20 MIN: CPT

## 2020-06-03 ENCOUNTER — APPOINTMENT (OUTPATIENT)
Dept: OBGYN | Facility: CLINIC | Age: 35
End: 2020-06-03
Payer: MEDICAID

## 2020-06-03 ENCOUNTER — NON-APPOINTMENT (OUTPATIENT)
Age: 35
End: 2020-06-03

## 2020-06-03 VITALS — SYSTOLIC BLOOD PRESSURE: 118 MMHG | DIASTOLIC BLOOD PRESSURE: 66 MMHG | WEIGHT: 113 LBS | BODY MASS INDEX: 20.02 KG/M2

## 2020-06-03 PROCEDURE — 0502F SUBSEQUENT PRENATAL CARE: CPT

## 2020-06-29 ENCOUNTER — APPOINTMENT (OUTPATIENT)
Dept: ANTEPARTUM | Facility: CLINIC | Age: 35
End: 2020-06-29
Payer: MEDICAID

## 2020-06-29 ENCOUNTER — ASOB RESULT (OUTPATIENT)
Age: 35
End: 2020-06-29

## 2020-06-29 PROCEDURE — 76811 OB US DETAILED SNGL FETUS: CPT

## 2020-07-01 ENCOUNTER — NON-APPOINTMENT (OUTPATIENT)
Age: 35
End: 2020-07-01

## 2020-07-01 ENCOUNTER — APPOINTMENT (OUTPATIENT)
Dept: OBGYN | Facility: CLINIC | Age: 35
End: 2020-07-01
Payer: MEDICAID

## 2020-07-01 VITALS — DIASTOLIC BLOOD PRESSURE: 72 MMHG | SYSTOLIC BLOOD PRESSURE: 116 MMHG | WEIGHT: 114 LBS | BODY MASS INDEX: 20.19 KG/M2

## 2020-07-01 PROCEDURE — 0502F SUBSEQUENT PRENATAL CARE: CPT

## 2020-07-22 ENCOUNTER — APPOINTMENT (OUTPATIENT)
Dept: OBGYN | Facility: CLINIC | Age: 35
End: 2020-07-22
Payer: MEDICAID

## 2020-07-22 ENCOUNTER — NON-APPOINTMENT (OUTPATIENT)
Age: 35
End: 2020-07-22

## 2020-07-22 VITALS
SYSTOLIC BLOOD PRESSURE: 114 MMHG | DIASTOLIC BLOOD PRESSURE: 82 MMHG | TEMPERATURE: 96.8 F | WEIGHT: 114 LBS | BODY MASS INDEX: 20.19 KG/M2

## 2020-07-22 PROCEDURE — 0502F SUBSEQUENT PRENATAL CARE: CPT

## 2020-08-06 ENCOUNTER — OUTPATIENT (OUTPATIENT)
Dept: OUTPATIENT SERVICES | Facility: HOSPITAL | Age: 35
LOS: 1 days | End: 2020-08-06
Payer: MEDICAID

## 2020-08-06 ENCOUNTER — EMERGENCY (EMERGENCY)
Facility: HOSPITAL | Age: 35
LOS: 1 days | Discharge: HOME | End: 2020-08-06
Attending: STUDENT IN AN ORGANIZED HEALTH CARE EDUCATION/TRAINING PROGRAM
Payer: MEDICAID

## 2020-08-06 VITALS
SYSTOLIC BLOOD PRESSURE: 124 MMHG | HEART RATE: 98 BPM | TEMPERATURE: 98 F | RESPIRATION RATE: 16 BRPM | DIASTOLIC BLOOD PRESSURE: 74 MMHG | OXYGEN SATURATION: 98 %

## 2020-08-06 DIAGNOSIS — Z3A.00 WEEKS OF GESTATION OF PREGNANCY NOT SPECIFIED: ICD-10-CM

## 2020-08-06 DIAGNOSIS — O26.899 OTHER SPECIFIED PREGNANCY RELATED CONDITIONS, UNSPECIFIED TRIMESTER: ICD-10-CM

## 2020-08-06 LAB
ALBUMIN SERPL ELPH-MCNC: 2.6 G/DL — LOW (ref 3.5–5)
ALP SERPL-CCNC: 65 U/L — SIGNIFICANT CHANGE UP (ref 40–120)
ALT FLD-CCNC: 18 U/L DA — SIGNIFICANT CHANGE UP (ref 10–60)
ANION GAP SERPL CALC-SCNC: 7 MMOL/L — SIGNIFICANT CHANGE UP (ref 5–17)
AST SERPL-CCNC: 15 U/L — SIGNIFICANT CHANGE UP (ref 10–40)
BASOPHILS # BLD AUTO: 0.03 K/UL — SIGNIFICANT CHANGE UP (ref 0–0.2)
BASOPHILS NFR BLD AUTO: 0.3 % — SIGNIFICANT CHANGE UP (ref 0–2)
BILIRUB SERPL-MCNC: 0.1 MG/DL — LOW (ref 0.2–1.2)
BUN SERPL-MCNC: 11 MG/DL — SIGNIFICANT CHANGE UP (ref 7–18)
CALCIUM SERPL-MCNC: 8.2 MG/DL — LOW (ref 8.4–10.5)
CHLORIDE SERPL-SCNC: 105 MMOL/L — SIGNIFICANT CHANGE UP (ref 96–108)
CO2 SERPL-SCNC: 26 MMOL/L — SIGNIFICANT CHANGE UP (ref 22–31)
CREAT SERPL-MCNC: 0.59 MG/DL — SIGNIFICANT CHANGE UP (ref 0.5–1.3)
EOSINOPHIL # BLD AUTO: 0.22 K/UL — SIGNIFICANT CHANGE UP (ref 0–0.5)
EOSINOPHIL NFR BLD AUTO: 2.5 % — SIGNIFICANT CHANGE UP (ref 0–6)
GLUCOSE SERPL-MCNC: 82 MG/DL — SIGNIFICANT CHANGE UP (ref 70–99)
HCT VFR BLD CALC: 36.3 % — SIGNIFICANT CHANGE UP (ref 34.5–45)
HGB BLD-MCNC: 12 G/DL — SIGNIFICANT CHANGE UP (ref 11.5–15.5)
IMM GRANULOCYTES NFR BLD AUTO: 0.4 % — SIGNIFICANT CHANGE UP (ref 0–1.5)
LYMPHOCYTES # BLD AUTO: 1.7 K/UL — SIGNIFICANT CHANGE UP (ref 1–3.3)
LYMPHOCYTES # BLD AUTO: 19.1 % — SIGNIFICANT CHANGE UP (ref 13–44)
MCHC RBC-ENTMCNC: 32.1 PG — SIGNIFICANT CHANGE UP (ref 27–34)
MCHC RBC-ENTMCNC: 33.1 GM/DL — SIGNIFICANT CHANGE UP (ref 32–36)
MCV RBC AUTO: 97.1 FL — SIGNIFICANT CHANGE UP (ref 80–100)
MONOCYTES # BLD AUTO: 0.54 K/UL — SIGNIFICANT CHANGE UP (ref 0–0.9)
MONOCYTES NFR BLD AUTO: 6.1 % — SIGNIFICANT CHANGE UP (ref 2–14)
NEUTROPHILS # BLD AUTO: 6.39 K/UL — SIGNIFICANT CHANGE UP (ref 1.8–7.4)
NEUTROPHILS NFR BLD AUTO: 71.6 % — SIGNIFICANT CHANGE UP (ref 43–77)
NRBC # BLD: 0 /100 WBCS — SIGNIFICANT CHANGE UP (ref 0–0)
PLATELET # BLD AUTO: 173 K/UL — SIGNIFICANT CHANGE UP (ref 150–400)
POTASSIUM SERPL-MCNC: 3.6 MMOL/L — SIGNIFICANT CHANGE UP (ref 3.5–5.3)
POTASSIUM SERPL-SCNC: 3.6 MMOL/L — SIGNIFICANT CHANGE UP (ref 3.5–5.3)
PROT SERPL-MCNC: 6.6 G/DL — SIGNIFICANT CHANGE UP (ref 6–8.3)
RBC # BLD: 3.74 M/UL — LOW (ref 3.8–5.2)
RBC # FLD: 13.8 % — SIGNIFICANT CHANGE UP (ref 10.3–14.5)
SODIUM SERPL-SCNC: 138 MMOL/L — SIGNIFICANT CHANGE UP (ref 135–145)
WBC # BLD: 8.92 K/UL — SIGNIFICANT CHANGE UP (ref 3.8–10.5)
WBC # FLD AUTO: 8.92 K/UL — SIGNIFICANT CHANGE UP (ref 3.8–10.5)

## 2020-08-06 PROCEDURE — 85027 COMPLETE CBC AUTOMATED: CPT

## 2020-08-06 PROCEDURE — 93005 ELECTROCARDIOGRAM TRACING: CPT

## 2020-08-06 PROCEDURE — G0463: CPT

## 2020-08-06 PROCEDURE — 99283 EMERGENCY DEPT VISIT LOW MDM: CPT

## 2020-08-06 PROCEDURE — 80053 COMPREHEN METABOLIC PANEL: CPT

## 2020-08-06 PROCEDURE — 59025 FETAL NON-STRESS TEST: CPT

## 2020-08-06 PROCEDURE — 99282 EMERGENCY DEPT VISIT SF MDM: CPT

## 2020-08-06 PROCEDURE — 36415 COLL VENOUS BLD VENIPUNCTURE: CPT

## 2020-08-06 NOTE — ED PROVIDER NOTE - PROGRESS NOTE DETAILS
patient lab wnl. remains neuro intact. no headache during ed stay. endorses feeling well. no signs of distress. given instruction to f.u pmd and return precaution

## 2020-08-06 NOTE — ED ADULT TRIAGE NOTE - CHIEF COMPLAINT QUOTE
cleared from L&D, patient came to Ed with complains of dizziness and blurring of vision. all symptoms cleared in triage

## 2020-08-06 NOTE — ED PROVIDER NOTE - PATIENT PORTAL LINK FT
You can access the FollowMyHealth Patient Portal offered by [facility] by registering at the following website: [email or web address]. By joining [email or web address]’s FollowMyHealth portal, you will also be able to view your health information using other applications (apps) compatible with our system.

## 2020-08-06 NOTE — ED PROVIDER NOTE - OBJECTIVE STATEMENT
35 year old female with no significant PMHx or PSHx who is currently at 25 weeks gestation presents to the ED with complaints of an episode of dizziness and lightheadedness today while at work. Patient states that the episode lasted for approximately ten minutes. Patient reports some blurred vision in her left eye at the time. Patient otherwise denies any chest pain, fever, nausea, vomiting, focal weakness, and all other acute complaints. Patient endorses that her symptoms, including the blurred vision, resolved after ten minutes and was asymptomatic upon arrival in the ED. Patient endorses that she had a doppler of her neck showing mild stenosis between 16 and 49 percent, but without any clear percentage of stenosis. NKDA.

## 2020-08-06 NOTE — ED PROVIDER NOTE - CLINICAL SUMMARY MEDICAL DECISION MAKING FREE TEXT BOX
Patient presenting for dizziness, blurred vision that resolved in setting of hypotension, endorses checking bp, systolic in the 80s. currently symptoms resolved. no cp, focal weakness, numbness. endorses carotid stenosis but report states mild. no active head. no neuro finding to suggest stroke, no persistent head or cranial nerve deficit to suggest venous thrombosis, no neck stiffness or headache to suggest ich or meninigits. no indication for neuro imaging at this time. will obtain lab, assess hgb, lyte abnormality. ed obs and reassess

## 2020-08-24 ENCOUNTER — NON-APPOINTMENT (OUTPATIENT)
Age: 35
End: 2020-08-24

## 2020-08-24 ENCOUNTER — APPOINTMENT (OUTPATIENT)
Dept: OBGYN | Facility: CLINIC | Age: 35
End: 2020-08-24
Payer: MEDICAID

## 2020-08-24 VITALS
DIASTOLIC BLOOD PRESSURE: 62 MMHG | TEMPERATURE: 97.1 F | SYSTOLIC BLOOD PRESSURE: 106 MMHG | WEIGHT: 122 LBS | BODY MASS INDEX: 21.61 KG/M2

## 2020-08-24 PROCEDURE — 0502F SUBSEQUENT PRENATAL CARE: CPT

## 2020-09-14 ENCOUNTER — APPOINTMENT (OUTPATIENT)
Dept: OBGYN | Facility: CLINIC | Age: 35
End: 2020-09-14
Payer: MEDICAID

## 2020-09-14 ENCOUNTER — NON-APPOINTMENT (OUTPATIENT)
Age: 35
End: 2020-09-14

## 2020-09-14 VITALS — WEIGHT: 120 LBS | BODY MASS INDEX: 21.26 KG/M2

## 2020-09-14 DIAGNOSIS — Z34.90 ENCOUNTER FOR SUPERVISION OF NORMAL PREGNANCY, UNSPECIFIED, UNSPECIFIED TRIMESTER: ICD-10-CM

## 2020-09-14 PROCEDURE — 0502F SUBSEQUENT PRENATAL CARE: CPT

## 2020-09-14 RX ORDER — VITAMIN A ACETATE, .BETA.-CAROTENE, ASCORBIC ACID, CHOLECALCIFEROL, .ALPHA.-TOCOPHEROL ACETATE, DL-, THIAMINE MONONITRATE, RIBOFLAVIN, NIACINAMIDE, PYRIDOXINE HYDROCHLORIDE, FOLIC ACID, CYANOCOBALAMIN, CALCIUM CARBONATE, FERROUS FUMARATE, ZINC OXIDE, AND CUPRIC OXIDE 2000; 2000; 120; 400; 22; 1.84; 3; 20; 10; 1; 12; 200; 27; 25; 2 [IU]/1; [IU]/1; MG/1; [IU]/1; MG/1; MG/1; MG/1; MG/1; MG/1; MG/1; UG/1; MG/1; MG/1; MG/1; MG/1
27-1 TABLET ORAL DAILY
Qty: 30 | Refills: 11 | Status: ACTIVE | COMMUNITY
Start: 2020-09-14 | End: 1900-01-01

## 2020-09-21 ENCOUNTER — APPOINTMENT (OUTPATIENT)
Dept: ANTEPARTUM | Facility: CLINIC | Age: 35
End: 2020-09-21
Payer: MEDICAID

## 2020-09-21 ENCOUNTER — ASOB RESULT (OUTPATIENT)
Age: 35
End: 2020-09-21

## 2020-09-21 PROCEDURE — 76817 TRANSVAGINAL US OBSTETRIC: CPT

## 2020-09-21 PROCEDURE — 76816 OB US FOLLOW-UP PER FETUS: CPT

## 2020-09-28 ENCOUNTER — NON-APPOINTMENT (OUTPATIENT)
Age: 35
End: 2020-09-28

## 2020-09-28 ENCOUNTER — APPOINTMENT (OUTPATIENT)
Dept: OBGYN | Facility: CLINIC | Age: 35
End: 2020-09-28
Payer: MEDICAID

## 2020-09-28 VITALS — DIASTOLIC BLOOD PRESSURE: 74 MMHG | WEIGHT: 124 LBS | BODY MASS INDEX: 21.97 KG/M2 | SYSTOLIC BLOOD PRESSURE: 110 MMHG

## 2020-09-28 PROCEDURE — 0502F SUBSEQUENT PRENATAL CARE: CPT

## 2020-10-15 ENCOUNTER — LABORATORY RESULT (OUTPATIENT)
Age: 35
End: 2020-10-15

## 2020-10-15 ENCOUNTER — APPOINTMENT (OUTPATIENT)
Dept: OBGYN | Facility: CLINIC | Age: 35
End: 2020-10-15
Payer: MEDICAID

## 2020-10-15 ENCOUNTER — NON-APPOINTMENT (OUTPATIENT)
Age: 35
End: 2020-10-15

## 2020-10-15 VITALS — BODY MASS INDEX: 22.5 KG/M2 | WEIGHT: 127 LBS | SYSTOLIC BLOOD PRESSURE: 114 MMHG | DIASTOLIC BLOOD PRESSURE: 82 MMHG

## 2020-10-15 PROCEDURE — 0502F SUBSEQUENT PRENATAL CARE: CPT

## 2020-10-22 ENCOUNTER — APPOINTMENT (OUTPATIENT)
Dept: OBGYN | Facility: CLINIC | Age: 35
End: 2020-10-22
Payer: MEDICAID

## 2020-10-22 ENCOUNTER — NON-APPOINTMENT (OUTPATIENT)
Age: 35
End: 2020-10-22

## 2020-10-22 VITALS — SYSTOLIC BLOOD PRESSURE: 112 MMHG | DIASTOLIC BLOOD PRESSURE: 72 MMHG | WEIGHT: 130 LBS | BODY MASS INDEX: 23.03 KG/M2

## 2020-10-22 PROCEDURE — 99072 ADDL SUPL MATRL&STAF TM PHE: CPT

## 2020-10-22 PROCEDURE — 0502F SUBSEQUENT PRENATAL CARE: CPT

## 2020-10-29 ENCOUNTER — NON-APPOINTMENT (OUTPATIENT)
Age: 35
End: 2020-10-29

## 2020-10-29 ENCOUNTER — APPOINTMENT (OUTPATIENT)
Dept: OBGYN | Facility: CLINIC | Age: 35
End: 2020-10-29
Payer: MEDICAID

## 2020-10-29 VITALS — SYSTOLIC BLOOD PRESSURE: 122 MMHG | BODY MASS INDEX: 22.85 KG/M2 | DIASTOLIC BLOOD PRESSURE: 74 MMHG | WEIGHT: 129 LBS

## 2020-10-29 PROCEDURE — 0502F SUBSEQUENT PRENATAL CARE: CPT

## 2020-10-29 PROCEDURE — 99072 ADDL SUPL MATRL&STAF TM PHE: CPT

## 2020-11-05 ENCOUNTER — APPOINTMENT (OUTPATIENT)
Dept: OBGYN | Facility: CLINIC | Age: 35
End: 2020-11-05
Payer: MEDICAID

## 2020-11-05 ENCOUNTER — NON-APPOINTMENT (OUTPATIENT)
Age: 35
End: 2020-11-05

## 2020-11-05 VITALS
HEIGHT: 63 IN | BODY MASS INDEX: 23.04 KG/M2 | SYSTOLIC BLOOD PRESSURE: 114 MMHG | DIASTOLIC BLOOD PRESSURE: 68 MMHG | WEIGHT: 130 LBS

## 2020-11-05 PROCEDURE — 0502F SUBSEQUENT PRENATAL CARE: CPT

## 2020-11-05 PROCEDURE — 99072 ADDL SUPL MATRL&STAF TM PHE: CPT

## 2020-11-12 ENCOUNTER — APPOINTMENT (OUTPATIENT)
Dept: OBGYN | Facility: CLINIC | Age: 35
End: 2020-11-12
Payer: MEDICAID

## 2020-11-12 ENCOUNTER — NON-APPOINTMENT (OUTPATIENT)
Age: 35
End: 2020-11-12

## 2020-11-12 VITALS — SYSTOLIC BLOOD PRESSURE: 116 MMHG | BODY MASS INDEX: 23.03 KG/M2 | WEIGHT: 130 LBS | DIASTOLIC BLOOD PRESSURE: 74 MMHG

## 2020-11-12 PROCEDURE — 0502F SUBSEQUENT PRENATAL CARE: CPT

## 2020-11-13 ENCOUNTER — INPATIENT (INPATIENT)
Facility: HOSPITAL | Age: 35
LOS: 0 days | Discharge: HOME | End: 2020-11-14
Attending: OBSTETRICS & GYNECOLOGY | Admitting: OBSTETRICS & GYNECOLOGY
Payer: MEDICAID

## 2020-11-13 VITALS — HEIGHT: 63 IN | WEIGHT: 127.87 LBS

## 2020-11-13 DIAGNOSIS — Z3A.00 WEEKS OF GESTATION OF PREGNANCY NOT SPECIFIED: ICD-10-CM

## 2020-11-13 DIAGNOSIS — Z34.80 ENCOUNTER FOR SUPERVISION OF OTHER NORMAL PREGNANCY, UNSPECIFIED TRIMESTER: ICD-10-CM

## 2020-11-13 DIAGNOSIS — O26.899 OTHER SPECIFIED PREGNANCY RELATED CONDITIONS, UNSPECIFIED TRIMESTER: ICD-10-CM

## 2020-11-13 LAB
APTT BLD: 26.3 SEC — LOW (ref 27.5–35.5)
BASOPHILS # BLD AUTO: 0.02 K/UL — SIGNIFICANT CHANGE UP (ref 0–0.2)
BASOPHILS # BLD AUTO: 0.03 K/UL — SIGNIFICANT CHANGE UP (ref 0–0.2)
BASOPHILS NFR BLD AUTO: 0.2 % — SIGNIFICANT CHANGE UP (ref 0–2)
BASOPHILS NFR BLD AUTO: 0.3 % — SIGNIFICANT CHANGE UP (ref 0–2)
BLD GP AB SCN SERPL QL: SIGNIFICANT CHANGE UP
EOSINOPHIL # BLD AUTO: 0.14 K/UL — SIGNIFICANT CHANGE UP (ref 0–0.5)
EOSINOPHIL # BLD AUTO: 0.16 K/UL — SIGNIFICANT CHANGE UP (ref 0–0.5)
EOSINOPHIL NFR BLD AUTO: 1.2 % — SIGNIFICANT CHANGE UP (ref 0–6)
EOSINOPHIL NFR BLD AUTO: 1.5 % — SIGNIFICANT CHANGE UP (ref 0–6)
FIBRINOGEN PPP-MCNC: 563 MG/DL — HIGH (ref 290–520)
HCT VFR BLD CALC: 35.9 % — SIGNIFICANT CHANGE UP (ref 34.5–45)
HCT VFR BLD CALC: 40.2 % — SIGNIFICANT CHANGE UP (ref 34.5–45)
HGB BLD-MCNC: 12 G/DL — SIGNIFICANT CHANGE UP (ref 11.5–15.5)
HGB BLD-MCNC: 13.5 G/DL — SIGNIFICANT CHANGE UP (ref 11.5–15.5)
IMM GRANULOCYTES NFR BLD AUTO: 0.5 % — SIGNIFICANT CHANGE UP (ref 0–1.5)
IMM GRANULOCYTES NFR BLD AUTO: 0.6 % — SIGNIFICANT CHANGE UP (ref 0–1.5)
INR BLD: 0.97 RATIO — SIGNIFICANT CHANGE UP (ref 0.88–1.16)
LYMPHOCYTES # BLD AUTO: 1.48 K/UL — SIGNIFICANT CHANGE UP (ref 1–3.3)
LYMPHOCYTES # BLD AUTO: 1.74 K/UL — SIGNIFICANT CHANGE UP (ref 1–3.3)
LYMPHOCYTES # BLD AUTO: 13.4 % — SIGNIFICANT CHANGE UP (ref 13–44)
LYMPHOCYTES # BLD AUTO: 15.1 % — SIGNIFICANT CHANGE UP (ref 13–44)
MCHC RBC-ENTMCNC: 32.3 PG — SIGNIFICANT CHANGE UP (ref 27–34)
MCHC RBC-ENTMCNC: 32.6 PG — SIGNIFICANT CHANGE UP (ref 27–34)
MCHC RBC-ENTMCNC: 33.4 GM/DL — SIGNIFICANT CHANGE UP (ref 32–36)
MCHC RBC-ENTMCNC: 33.6 GM/DL — SIGNIFICANT CHANGE UP (ref 32–36)
MCV RBC AUTO: 96.2 FL — SIGNIFICANT CHANGE UP (ref 80–100)
MCV RBC AUTO: 97.6 FL — SIGNIFICANT CHANGE UP (ref 80–100)
MONOCYTES # BLD AUTO: 0.57 K/UL — SIGNIFICANT CHANGE UP (ref 0–0.9)
MONOCYTES # BLD AUTO: 0.61 K/UL — SIGNIFICANT CHANGE UP (ref 0–0.9)
MONOCYTES NFR BLD AUTO: 4.9 % — SIGNIFICANT CHANGE UP (ref 2–14)
MONOCYTES NFR BLD AUTO: 5.5 % — SIGNIFICANT CHANGE UP (ref 2–14)
NEUTROPHILS # BLD AUTO: 8.68 K/UL — HIGH (ref 1.8–7.4)
NEUTROPHILS # BLD AUTO: 9.02 K/UL — HIGH (ref 1.8–7.4)
NEUTROPHILS NFR BLD AUTO: 78 % — HIGH (ref 43–77)
NEUTROPHILS NFR BLD AUTO: 78.8 % — HIGH (ref 43–77)
NRBC # BLD: 0 /100 WBCS — SIGNIFICANT CHANGE UP (ref 0–0)
NRBC # BLD: 0 /100 WBCS — SIGNIFICANT CHANGE UP (ref 0–0)
PLATELET # BLD AUTO: 156 K/UL — SIGNIFICANT CHANGE UP (ref 150–400)
PLATELET # BLD AUTO: 163 K/UL — SIGNIFICANT CHANGE UP (ref 150–400)
PROTHROM AB SERPL-ACNC: 11.6 SEC — SIGNIFICANT CHANGE UP (ref 10.6–13.6)
RBC # BLD: 3.68 M/UL — LOW (ref 3.8–5.2)
RBC # BLD: 4.18 M/UL — SIGNIFICANT CHANGE UP (ref 3.8–5.2)
RBC # FLD: 13.5 % — SIGNIFICANT CHANGE UP (ref 10.3–14.5)
RBC # FLD: 13.7 % — SIGNIFICANT CHANGE UP (ref 10.3–14.5)
SARS-COV-2 IGG SERPL QL IA: NEGATIVE — SIGNIFICANT CHANGE UP
SARS-COV-2 IGM SERPL IA-ACNC: <0.1 INDEX — SIGNIFICANT CHANGE UP
SARS-COV-2 RNA SPEC QL NAA+PROBE: SIGNIFICANT CHANGE UP
T PALLIDUM AB TITR SER: NEGATIVE — SIGNIFICANT CHANGE UP
WBC # BLD: 11.01 K/UL — HIGH (ref 3.8–10.5)
WBC # BLD: 11.56 K/UL — HIGH (ref 3.8–10.5)
WBC # FLD AUTO: 11.01 K/UL — HIGH (ref 3.8–10.5)
WBC # FLD AUTO: 11.56 K/UL — HIGH (ref 3.8–10.5)

## 2020-11-13 PROCEDURE — 59409 OBSTETRICAL CARE: CPT | Mod: U9

## 2020-11-13 RX ORDER — HYDROCORTISONE 1 %
1 OINTMENT (GRAM) TOPICAL EVERY 6 HOURS
Refills: 0 | Status: DISCONTINUED | OUTPATIENT
Start: 2020-11-13 | End: 2020-11-14

## 2020-11-13 RX ORDER — ACETAMINOPHEN 500 MG
975 TABLET ORAL
Refills: 0 | Status: DISCONTINUED | OUTPATIENT
Start: 2020-11-13 | End: 2020-11-14

## 2020-11-13 RX ORDER — TETANUS TOXOID, REDUCED DIPHTHERIA TOXOID AND ACELLULAR PERTUSSIS VACCINE, ADSORBED 5; 2.5; 8; 8; 2.5 [IU]/.5ML; [IU]/.5ML; UG/.5ML; UG/.5ML; UG/.5ML
0.5 SUSPENSION INTRAMUSCULAR ONCE
Refills: 0 | Status: DISCONTINUED | OUTPATIENT
Start: 2020-11-13 | End: 2020-11-14

## 2020-11-13 RX ORDER — IBUPROFEN 200 MG
600 TABLET ORAL EVERY 6 HOURS
Refills: 0 | Status: DISCONTINUED | OUTPATIENT
Start: 2020-11-13 | End: 2020-11-14

## 2020-11-13 RX ORDER — OXYCODONE HYDROCHLORIDE 5 MG/1
5 TABLET ORAL
Refills: 0 | Status: DISCONTINUED | OUTPATIENT
Start: 2020-11-13 | End: 2020-11-14

## 2020-11-13 RX ORDER — BENZOCAINE 10 %
1 GEL (GRAM) MUCOUS MEMBRANE EVERY 6 HOURS
Refills: 0 | Status: DISCONTINUED | OUTPATIENT
Start: 2020-11-13 | End: 2020-11-14

## 2020-11-13 RX ORDER — MAGNESIUM HYDROXIDE 400 MG/1
30 TABLET, CHEWABLE ORAL
Refills: 0 | Status: DISCONTINUED | OUTPATIENT
Start: 2020-11-13 | End: 2020-11-14

## 2020-11-13 RX ORDER — SODIUM CHLORIDE 9 MG/ML
1000 INJECTION, SOLUTION INTRAVENOUS
Refills: 0 | Status: DISCONTINUED | OUTPATIENT
Start: 2020-11-13 | End: 2020-11-13

## 2020-11-13 RX ORDER — KETOROLAC TROMETHAMINE 30 MG/ML
30 SYRINGE (ML) INJECTION ONCE
Refills: 0 | Status: DISCONTINUED | OUTPATIENT
Start: 2020-11-13 | End: 2020-11-13

## 2020-11-13 RX ORDER — AMPICILLIN TRIHYDRATE 250 MG
2 CAPSULE ORAL ONCE
Refills: 0 | Status: COMPLETED | OUTPATIENT
Start: 2020-11-13 | End: 2020-11-13

## 2020-11-13 RX ORDER — PRAMOXINE HYDROCHLORIDE 150 MG/15G
1 AEROSOL, FOAM RECTAL EVERY 4 HOURS
Refills: 0 | Status: DISCONTINUED | OUTPATIENT
Start: 2020-11-13 | End: 2020-11-14

## 2020-11-13 RX ORDER — AMPICILLIN TRIHYDRATE 250 MG
1 CAPSULE ORAL EVERY 4 HOURS
Refills: 0 | Status: DISCONTINUED | OUTPATIENT
Start: 2020-11-13 | End: 2020-11-13

## 2020-11-13 RX ORDER — ASCORBIC ACID 60 MG
500 TABLET,CHEWABLE ORAL DAILY
Refills: 0 | Status: DISCONTINUED | OUTPATIENT
Start: 2020-11-13 | End: 2020-11-14

## 2020-11-13 RX ORDER — INFLUENZA VIRUS VACCINE 15; 15; 15; 15 UG/.5ML; UG/.5ML; UG/.5ML; UG/.5ML
0.5 SUSPENSION INTRAMUSCULAR ONCE
Refills: 0 | Status: DISCONTINUED | OUTPATIENT
Start: 2020-11-13 | End: 2020-11-14

## 2020-11-13 RX ORDER — AER TRAVELER 0.5 G/1
1 SOLUTION RECTAL; TOPICAL EVERY 4 HOURS
Refills: 0 | Status: DISCONTINUED | OUTPATIENT
Start: 2020-11-13 | End: 2020-11-14

## 2020-11-13 RX ORDER — SIMETHICONE 80 MG/1
80 TABLET, CHEWABLE ORAL EVERY 4 HOURS
Refills: 0 | Status: DISCONTINUED | OUTPATIENT
Start: 2020-11-13 | End: 2020-11-14

## 2020-11-13 RX ORDER — SODIUM CHLORIDE 9 MG/ML
3 INJECTION INTRAMUSCULAR; INTRAVENOUS; SUBCUTANEOUS EVERY 8 HOURS
Refills: 0 | Status: DISCONTINUED | OUTPATIENT
Start: 2020-11-13 | End: 2020-11-14

## 2020-11-13 RX ORDER — DIBUCAINE 1 %
1 OINTMENT (GRAM) RECTAL EVERY 6 HOURS
Refills: 0 | Status: DISCONTINUED | OUTPATIENT
Start: 2020-11-13 | End: 2020-11-14

## 2020-11-13 RX ORDER — IBUPROFEN 200 MG
600 TABLET ORAL EVERY 6 HOURS
Refills: 0 | Status: COMPLETED | OUTPATIENT
Start: 2020-11-13 | End: 2021-10-12

## 2020-11-13 RX ORDER — LANOLIN
1 OINTMENT (GRAM) TOPICAL EVERY 6 HOURS
Refills: 0 | Status: DISCONTINUED | OUTPATIENT
Start: 2020-11-13 | End: 2020-11-14

## 2020-11-13 RX ORDER — FERROUS SULFATE 325(65) MG
325 TABLET ORAL DAILY
Refills: 0 | Status: DISCONTINUED | OUTPATIENT
Start: 2020-11-13 | End: 2020-11-14

## 2020-11-13 RX ORDER — OXYTOCIN 10 UNIT/ML
333.33 VIAL (ML) INJECTION
Qty: 20 | Refills: 0 | Status: DISCONTINUED | OUTPATIENT
Start: 2020-11-13 | End: 2020-11-14

## 2020-11-13 RX ORDER — CITRIC ACID/SODIUM CITRATE 300-500 MG
15 SOLUTION, ORAL ORAL EVERY 6 HOURS
Refills: 0 | Status: DISCONTINUED | OUTPATIENT
Start: 2020-11-13 | End: 2020-11-13

## 2020-11-13 RX ORDER — DIPHENHYDRAMINE HCL 50 MG
25 CAPSULE ORAL EVERY 6 HOURS
Refills: 0 | Status: DISCONTINUED | OUTPATIENT
Start: 2020-11-13 | End: 2020-11-14

## 2020-11-13 RX ADMIN — Medication 975 MILLIGRAM(S): at 22:45

## 2020-11-13 RX ADMIN — SODIUM CHLORIDE 125 MILLILITER(S): 9 INJECTION, SOLUTION INTRAVENOUS at 01:10

## 2020-11-13 RX ADMIN — SODIUM CHLORIDE 3 MILLILITER(S): 9 INJECTION INTRAMUSCULAR; INTRAVENOUS; SUBCUTANEOUS at 05:33

## 2020-11-13 RX ADMIN — Medication 500 MILLIGRAM(S): at 13:10

## 2020-11-13 RX ADMIN — AER TRAVELER 1 APPLICATION(S): 0.5 SOLUTION RECTAL; TOPICAL at 05:33

## 2020-11-13 RX ADMIN — Medication 325 MILLIGRAM(S): at 13:10

## 2020-11-13 RX ADMIN — Medication 1 TABLET(S): at 13:09

## 2020-11-13 RX ADMIN — Medication 1000 MILLIUNIT(S)/MIN: at 02:47

## 2020-11-13 RX ADMIN — Medication 1 SPRAY(S): at 05:32

## 2020-11-13 RX ADMIN — Medication 30 MILLIGRAM(S): at 04:26

## 2020-11-13 RX ADMIN — SODIUM CHLORIDE 3 MILLILITER(S): 9 INJECTION INTRAMUSCULAR; INTRAVENOUS; SUBCUTANEOUS at 22:00

## 2020-11-13 RX ADMIN — Medication 975 MILLIGRAM(S): at 22:16

## 2020-11-13 RX ADMIN — Medication 216 GRAM(S): at 01:31

## 2020-11-14 ENCOUNTER — TRANSCRIPTION ENCOUNTER (OUTPATIENT)
Age: 35
End: 2020-11-14

## 2020-11-14 VITALS
HEART RATE: 92 BPM | TEMPERATURE: 98 F | DIASTOLIC BLOOD PRESSURE: 70 MMHG | SYSTOLIC BLOOD PRESSURE: 104 MMHG | OXYGEN SATURATION: 99 % | RESPIRATION RATE: 17 BRPM

## 2020-11-14 PROCEDURE — 86769 SARS-COV-2 COVID-19 ANTIBODY: CPT

## 2020-11-14 PROCEDURE — 85730 THROMBOPLASTIN TIME PARTIAL: CPT

## 2020-11-14 PROCEDURE — 59025 FETAL NON-STRESS TEST: CPT

## 2020-11-14 PROCEDURE — 86780 TREPONEMA PALLIDUM: CPT

## 2020-11-14 PROCEDURE — 59050 FETAL MONITOR W/REPORT: CPT

## 2020-11-14 PROCEDURE — 85384 FIBRINOGEN ACTIVITY: CPT

## 2020-11-14 PROCEDURE — 86900 BLOOD TYPING SEROLOGIC ABO: CPT

## 2020-11-14 PROCEDURE — 87635 SARS-COV-2 COVID-19 AMP PRB: CPT

## 2020-11-14 PROCEDURE — 36415 COLL VENOUS BLD VENIPUNCTURE: CPT

## 2020-11-14 PROCEDURE — 85025 COMPLETE CBC W/AUTO DIFF WBC: CPT

## 2020-11-14 PROCEDURE — G0463: CPT

## 2020-11-14 PROCEDURE — 86850 RBC ANTIBODY SCREEN: CPT

## 2020-11-14 PROCEDURE — 86901 BLOOD TYPING SEROLOGIC RH(D): CPT

## 2020-11-14 PROCEDURE — 85610 PROTHROMBIN TIME: CPT

## 2020-11-14 RX ORDER — DOCUSATE SODIUM 100 MG
1 CAPSULE ORAL
Qty: 0 | Refills: 0 | DISCHARGE

## 2020-11-14 RX ORDER — IBUPROFEN 200 MG
1 TABLET ORAL
Qty: 0 | Refills: 0 | DISCHARGE

## 2020-11-14 RX ORDER — ACETAMINOPHEN 500 MG
3 TABLET ORAL
Qty: 0 | Refills: 0 | DISCHARGE
Start: 2020-11-14

## 2020-11-14 RX ORDER — ASCORBIC ACID 60 MG
1 TABLET,CHEWABLE ORAL
Qty: 0 | Refills: 0 | DISCHARGE
Start: 2020-11-14

## 2020-11-14 RX ADMIN — Medication 325 MILLIGRAM(S): at 11:41

## 2020-11-14 RX ADMIN — Medication 600 MILLIGRAM(S): at 11:41

## 2020-11-14 RX ADMIN — Medication 500 MILLIGRAM(S): at 11:41

## 2020-11-14 RX ADMIN — Medication 1 TABLET(S): at 11:41

## 2020-11-14 RX ADMIN — SODIUM CHLORIDE 3 MILLILITER(S): 9 INJECTION INTRAMUSCULAR; INTRAVENOUS; SUBCUTANEOUS at 05:35

## 2020-11-14 NOTE — DISCHARGE NOTE OB - MATERIALS PROVIDED
Breastfeeding Log/Guide to Postpartum Care/Shaken Baby Prevention Handout/Bottle Feeding Log/  Immunization Record/Birth Certificate Instructions

## 2020-11-14 NOTE — DISCHARGE NOTE OB - PATIENT PORTAL LINK FT
You can access the FollowMyHealth Patient Portal offered by [facility] by registering at the following website: [email or web address]. By joining [employer]’s FollowMyHealth portal, you will also be able to view your health information using other applications (apps) compatible with our system.

## 2020-11-14 NOTE — DISCHARGE NOTE OB - DO NOT DIET OR “STARVE” YOURSELF INTO GETTING BACK TO PRE-PREGNANCY SHAPE
REQUESTING PHYSICIAN:    Shravan Coughlin MD.    Chief Complaint   Patient presents with   • Surgical Followup     Right Total Hip Arthroplasty DOS 3/1/17 patient was last seen by MIKEL Taylor on 3/14/17 she was doing well at that time pain was controlled it was recommended she continue her exercises at home patient states today she is continuing to do her exercises and doing well she is seen for further evaluation.   • Hip        HISTORY OF PRESENT ILLNESS:  Dee Dee is a 69 year old year old female patient who presents today for follow up of her right total hip arthroplasty. She is doing very well with minimal discomfort. She takes half a Vicodin tablet once or twice a day when she is to be relatively active.    ACTIVE MEDICAL PROBLEMS:  Patient Active Problem List   Diagnosis   • Current smoker   • Family history of MI (myocardial infarction)   • Hyperlipidemia   • left renal calculi   • Myalgia related to Crestor, 07/01/2007   • GERD (gastroesophageal reflux disease)   • Essential hypertension, benign   • Health care maintenance   • Primary osteoarthritis of right hip        ALLERGIES: no known allergies.    REVIEW OF SYSTEMS:    No recent painful urination, abdominal pain, chest pain, or shortness of breath.    PHYSICIAL EXAMINATION:    Constitutional:   The patient is healthy and well nourished.  Vital Signs: Blood pressure 118/60, pulse 64, height 5' 5\" (1.651 m), weight 60.8 kg., Body mass index is 22.3 kg/(m^2).    Cardiovascular:   Radial pulses are equal and symmetric.      Skin:   The skin is pink, warm, dry, and well perfused in all four extremities. There are no rashes, or psoriatic plaques noted.    Extremities:   She is walking with a nonantalgic gait stance is balanced and she has good hip range of motion with good strength    Neurologic:  Coordination is intact and symmetric in all four extremities.  Sensation to light touch is intact, equal, and symmetric in both upper and lower  extremities.  The patient is alert and oriented, and has an appropriate mood and affect.    DIAGNOSTIC STUDIES:  X-rays reveal a stable noncemented total hip arthroplasty which was inserted through an anterior approach    Impression:    Stable total hip    PLAN:    Follow-up in 1 year           Statement Selected

## 2020-11-14 NOTE — PROGRESS NOTE ADULT - PROBLEM SELECTOR PLAN 1
A/P:  PPD#1 s/p     -Continue pain management prn  -Encourage OOB and ambulation  -Continue current care  -Plan for discharge today

## 2020-11-14 NOTE — DISCHARGE NOTE OB - CARE PROVIDER_API CALL
Fabby Mims)  OBGYN at [address]  [place], [place] [id number]  Phone: [phone]  Fax: [phone]  Follow Up Time:

## 2020-11-14 NOTE — PROGRESS NOTE ADULT - SUBJECTIVE AND OBJECTIVE BOX
Patient seen at bedside resting comfortably offers no current complaints. Ambulating and voiding without difficulty.  Passing flatus and tolerating regular diet.  both breast/bottle feeding . Denies HA, CP, SOB, N/V/D, dizziness, palpitations, worsening abdominal pain, worsening vaginal bleeding, or any other concerns.    Vital Signs Last 24 Hrs  T(C): 36.6 (2020 06:00), Max: 36.8 (2020 18:00)  T(F): 97.9 (2020 06:00), Max: 98.2 (2020 18:00)  HR: 79 (2020 06:00) (79 - 86)  BP: 98/64 (2020 06:00) (90/54 - 111/63)  BP(mean): --  RR: 16 (2020 06:00) (16 - 17)  SpO2: 98% (2020 06:00) (97% - 98%)    Physical Exam:     Gen: A&Ox 3, NAD  Breast: Soft, nontender, nonengorged  Abdomen: Soft, nontender,  ND; Fundus firm below umbilicus  Gyn: mod lochia, intact/repaired  Ext: Nontender,  no worsening edema                          12.0   11.56 )-----------( 156      ( 2020 18:50 )             35.9       A/P:  PPD#1 s/p     -Continue pain management prn  -Encourage OOB and ambulation  -Continue current care  -Plan for discharge today

## 2020-11-14 NOTE — DISCHARGE NOTE OB - MEDICATION SUMMARY - MEDICATIONS TO TAKE
I will START or STAY ON the medications listed below when I get home from the hospital:    acetaminophen 325 mg oral tablet  -- 3 tab(s) by mouth , As needed, Mild Pain (1 - 3)  -- Indication: For pain    ferrous sulfate 325 mg (65 mg elemental iron) oral tablet  -- 1 tab(s) by mouth 3 times a day  -- Indication: For anemia    ascorbic acid 500 mg oral tablet  -- 1 tab(s) by mouth once a day  -- Indication: For Supplement

## 2020-11-14 NOTE — DISCHARGE NOTE OB - MEDICATION SUMMARY - MEDICATIONS TO CHANGE
I will SWITCH the dose or number of times a day I take the medications listed below when I get home from the hospital:    ibuprofen 400 mg oral tablet  -- 1 tab(s) by mouth every 6 hours    Colace 100 mg oral capsule  -- 1 cap(s) by mouth 2 times a day    ferrous sulfate 325 mg (65 mg elemental iron) oral tablet  -- 1 tab(s) by mouth 3 times a day

## 2020-11-30 NOTE — PATIENT PROFILE OB - BREASTFEEDING PROVIDES STABLE TEMPERATURE THROUGH SKIN TO SKIN CONTACT
Progress Note    2020  Admit Date: 2020  9:21 PM   NAME: Karla Falcon   :  1969   MRN:  [id number]   Attending: Mohini Bhakta MD  PCP:  None  Treatment Team: Attending Provider: Mohini Bhakta MD; Consulting Provider: Valerie Moon MD; Care Manager: Tacho Saucedo, RN; Student Nurse: Estiven Amezquita; Utilization Review: Shamir Liu, RN; Nurse Practitioner: Rd Newton NP; Primary Nurse: Leonard Dennison, RN; Staff Nurse: Raymon Marinelli, RN; Nurse Practitioner: Delia Abad NP    Full Code   SUBJECTIVE:   Mr. Lorene James is a 48 yo male with PMH of smoker, ETOH abuse, recent hospitalizations for traumatic pneumothorax and rib fx. Nasir Rome has had 2 recent admissions for same requiring chest tubes and TPA to assist with drainage, deemed no need for surgery at that time.  Pt found to have MSSA bacteremia and left chest wound.  ID consulted and pt tx with IV rocephin for 4-6 weeks with EOT 20. Nasir Rome presented to the ER by EMS after being found hypoxic with SOB at the infusion center.  CXR showed unchanged left sided empyema.  CT chest showed mildly enlarged multilobulated left empyema, multiple rib fx.  IR consulted. Pulm consulted. General Surgery consulted.  ID changed rocephin to ancef.  underwent VATs, chest tube X2, thoracic epidural placed, ROCKY placed.   pt pulled out drains. 1 chest tube replaced by Dr. Shimon Maya. Mars Resides left chest tube removed per Surgery.   hypoxic, spongy edema left mid back around mid axillary. CT chest obtained showed increased loculated fluid along left lateral lung margin extending into the chest wall, following VATS, increasing small basilar pleural effusion, left greater than right.  Surgery re-consulted. Pulmonary consulted.  ID extending Ancef until at least 20.  Required PRBC  for drop in hgb.  had O2 qualifier and needs home O2, uninsured so affordability may be an issue.  Repeat CT chest  showed loculated fluid collection in the left chest are smaller, left chest wall fluid collection is slightly larger. On RA during exam. Seems SOB with movement. States thinks he needs stronger pain meds for control of pain to be manageable. Past Medical History:   Diagnosis Date    GERD (gastroesophageal reflux disease)     once a week OTC med     No results found for this or any previous visit (from the past 24 hour(s)).   No Known Allergies  Current Facility-Administered Medications   Medication Dose Route Frequency Provider Last Rate Last Dose    LORazepam (ATIVAN) tablet 1 mg  1 mg Oral Q4H PRN Hay Turk NP   1 mg at 11/29/20 1305    oxyCODONE IR (ROXICODONE) tablet 7.5 mg  7.5 mg Oral Q4H PRN Hay Turk NP   7.5 mg at 11/30/20 0152    traMADoL (ULTRAM) tablet 50 mg  50 mg Oral Q6H PRN Hay Turk NP   50 mg at 11/30/20 0739    lidocaine 4 % patch 2 Patch  2 Patch TransDERmal Q24H Hay Turk NP   2 Patch at 11/29/20 1306    pantoprazole (PROTONIX) tablet 40 mg  40 mg Oral ACB Yu Carmona, DO   40 mg at 11/30/20 0525    albuterol (PROVENTIL VENTOLIN) nebulizer solution 2.5 mg  2.5 mg Nebulization Q6H PRN Nkechi Waldron MD        0.9% sodium chloride infusion 250 mL  250 mL IntraVENous PRN Ryanne Payton NP        cyclobenzaprine (FLEXERIL) tablet 10 mg  10 mg Oral TID Meron Beckham MD   10 mg at 11/30/20 9474    traZODone (DESYREL) tablet 50 mg  50 mg Oral QHS Hay Turk NP   50 mg at 11/29/20 2213    magnesium oxide (MAG-OX) tablet 400 mg  400 mg Oral BID Hay Turk NP   400 mg at 11/30/20 7054    calcium carbonate (TUMS) chewable tablet 200 mg [elemental]  200 mg Oral TID WITH MEALS Jaz Orozco MD   200 mg at 11/30/20 0740    0.9% sodium chloride infusion 250 mL  250 mL IntraVENous PRN Fabi Cooney, NP        naloxone (NARCAN) injection 0.4 mg  0.4 mg IntraVENous EVERY 2 MINUTES AS NEEDED Burke Annie Rebolledo, JENNA        central line flush (saline) syringe 10 mL  10 mL InterCATHeter PRN Carolina Arriaza MD        sodium chloride (NS) flush 5-40 mL  5-40 mL IntraVENous Q8H Jose Caraballo MD   10 mL at 20 0522    sodium chloride (NS) flush 5-40 mL  5-40 mL IntraVENous PRN Jose Caraballo MD   20 mL at 20 0153    promethazine (PHENERGAN) with saline injection 6.25 mg  6.25 mg IntraVENous Q15MIN PRN Jose Caraballo MD   6.25 mg at 20    central line flush (saline) syringe 10 mL  10 mL InterCATHeter PRN Edwin Stephenson MD        ceFAZolin (ANCEF) 2 g/20 mL in sterile water IV syringe  2 g IntraVENous Q8H Carolina Arriaza MD   2 g at 20 0153    sodium chloride (NS) flush 5-40 mL  5-40 mL IntraVENous Q8H Carolina Arriaza MD   10 mL at 20 0522    sodium chloride (NS) flush 5-40 mL  5-40 mL IntraVENous PRN Carolina Arriaza MD   20 mL at 20 0139    acetaminophen (TYLENOL) tablet 650 mg  650 mg Oral Q6H PRN Carolina Arriaza MD        Or    acetaminophen (TYLENOL) suppository 650 mg  650 mg Rectal Q6H PRN Carolina Arriaza MD        polyethylene glycol (MIRALAX) packet 17 g  17 g Oral DAILY PRN Carolina Ariraza MD        promethazine (PHENERGAN) tablet 12.5 mg  12.5 mg Oral Q6H PRN Carolina Arriaza MD        Or    ondansetron [facility]) injection 4 mg  4 mg IntraVENous Q6H PRN Carolina Arriaza MD   4 mg at 20 1938       Review of Systems negative with exception of pertinent positives noted above  PHYSICAL EXAM     Visit Vitals  /78   Pulse 96   Temp 98.2 °F (36.8 °C)   Resp 19   Ht 5' 10\" (1.778 m)   Wt 93 kg (205 lb)   SpO2 90%   BMI 29.41 kg/m²      Temp (24hrs), Av.7 °F (37.1 °C), Min:97.8 °F (36.6 °C), Max:99.5 °F (37.5 °C)    Oxygen Therapy  O2 Sat (%): 90 % (20 0710)  Pulse via Oximetry: 102 beats per minute (20)  O2 Device: Room air (20)  O2 Flow Rate (L/min): 2 l/min (20 0709)  FIO2 (%): 28 % (20 1317)  ETCO2 (mmHg): 99 mmHg (11/16/20 1849)    Intake/Output Summary (Last 24 hours) at 11/30/2020 1007  Last data filed at 11/29/2020 2354  Gross per 24 hour   Intake    Output 220 ml   Net -220 ml        General:       No acute distress, appears ill, uncomfortable in bed   Lungs:          Diminished left side. Dressing left chest c/d/i.   posterior chest wall with edema, no crepitus. Heart:            S1S2 present without murmurs rubs gallops. RRR. No LE edema  Abdomen:    Soft, non tender, non distended. BS present  Extremities: Moves ext spontaneously. No cyanosis  Neurologic:  A/O X3. No focal deficits.     Results summary of Diagnostic Studies/Procedures copied from within [facility] EMR:        Dedrick Chu 96 Problems    Diagnosis Date Noted    Acute respiratory failure with hypoxia ([facility] [address] 11/15/2020    Loculated empyema ([facility] [address] 11/15/2020    MSSA bacteremia 11/04/2020    Alcohol abuse 08/17/2016     Plan:    Acute resp failure with hypoxia/loculated empyema/rib fx with pneumothorax  Pulm signed off  IR consulted  General Surgery consulted,s/p VATS 11/17.   11/19 pulled all tubes out, chest tube replaced 11/20 removed  On rocephin from 11/13-11/16 now on Ancef with  EOT 12/16/20  ID following  Will need O2 on DC, uninsured, affordability may be an issue. 11/28 O2 qualifier desats to 86% RA.   Repeat Chest CT 11/28 as above, yesterday NP asked Surgery to review CT, awaiting recs   General Surgery signed off, F/U 1 week Dr. Soumya Márquez at [place]     MSSA Bacteremia  ID following  Rocephin 11/4-11/16  Ancef started 1/16 with EOT 12/16/20, hopefully can change to PO      Alcohol abuse  Ativan prn      Marked worsening of spongy edematous area left back without crepitus 11/21  CT chest 11/22 w worsening loculated fluid   Surgery and Pulmonary in 11/22   Added  flexeril for muscle spasm.      Acute blood loss anemia  11/22 and 11/23 transfused PRBC  hgb stable         PRIOR TO DISCHARGE:   Will need to be on po abx, currently undetermined date of transition   Will need home oxygen:  CM aware  Will need follow up with Pulmonary for current issue and sleep apnea workup   Will need PHP follow up for any pain meds >5 days   Will need surgical follow up       Notes, labs, VS, diagnostic testing reviewed        DVT Prophylaxis: SCD  Plan of Care Discussed with: Supervising MD Dr. Steff Perales, care team, pt        Mani Green, NP Statement Selected

## 2020-12-10 ENCOUNTER — APPOINTMENT (OUTPATIENT)
Dept: OBGYN | Facility: CLINIC | Age: 35
End: 2020-12-10
Payer: MEDICAID

## 2020-12-10 ENCOUNTER — NON-APPOINTMENT (OUTPATIENT)
Age: 35
End: 2020-12-10

## 2020-12-10 VITALS — SYSTOLIC BLOOD PRESSURE: 118 MMHG | WEIGHT: 116 LBS | BODY MASS INDEX: 20.55 KG/M2 | DIASTOLIC BLOOD PRESSURE: 72 MMHG

## 2020-12-10 DIAGNOSIS — Z30.011 ENCOUNTER FOR INITIAL PRESCRIPTION OF CONTRACEPTIVE PILLS: ICD-10-CM

## 2020-12-10 PROCEDURE — 0503F POSTPARTUM CARE VISIT: CPT

## 2020-12-10 RX ORDER — NORETHINDRONE 0.35 MG/1
0.35 TABLET ORAL DAILY
Qty: 1 | Refills: 12 | Status: ACTIVE | COMMUNITY
Start: 2020-12-10 | End: 1900-01-01

## 2021-01-29 ENCOUNTER — OUTPATIENT (OUTPATIENT)
Dept: OUTPATIENT SERVICES | Facility: HOSPITAL | Age: 36
LOS: 1 days | End: 2021-01-29
Payer: MEDICAID

## 2021-01-29 VITALS
HEIGHT: 64 IN | DIASTOLIC BLOOD PRESSURE: 73 MMHG | HEART RATE: 75 BPM | WEIGHT: 117.95 LBS | TEMPERATURE: 98 F | SYSTOLIC BLOOD PRESSURE: 112 MMHG

## 2021-01-29 DIAGNOSIS — Z29.9 ENCOUNTER FOR PROPHYLACTIC MEASURES, UNSPECIFIED: ICD-10-CM

## 2021-01-29 DIAGNOSIS — Z30.2 ENCOUNTER FOR STERILIZATION: ICD-10-CM

## 2021-01-29 DIAGNOSIS — Z01.818 ENCOUNTER FOR OTHER PREPROCEDURAL EXAMINATION: ICD-10-CM

## 2021-01-29 LAB
ANION GAP SERPL CALC-SCNC: 7 MMOL/L — SIGNIFICANT CHANGE UP (ref 5–17)
BLD GP AB SCN SERPL QL: SIGNIFICANT CHANGE UP
BUN SERPL-MCNC: 14 MG/DL — SIGNIFICANT CHANGE UP (ref 7–18)
CALCIUM SERPL-MCNC: 8.9 MG/DL — SIGNIFICANT CHANGE UP (ref 8.4–10.5)
CHLORIDE SERPL-SCNC: 103 MMOL/L — SIGNIFICANT CHANGE UP (ref 96–108)
CO2 SERPL-SCNC: 27 MMOL/L — SIGNIFICANT CHANGE UP (ref 22–31)
CREAT SERPL-MCNC: 0.64 MG/DL — SIGNIFICANT CHANGE UP (ref 0.5–1.3)
GLUCOSE SERPL-MCNC: 80 MG/DL — SIGNIFICANT CHANGE UP (ref 70–99)
HCT VFR BLD CALC: 42.8 % — SIGNIFICANT CHANGE UP (ref 34.5–45)
HGB BLD-MCNC: 13.9 G/DL — SIGNIFICANT CHANGE UP (ref 11.5–15.5)
MCHC RBC-ENTMCNC: 30.7 PG — SIGNIFICANT CHANGE UP (ref 27–34)
MCHC RBC-ENTMCNC: 32.5 GM/DL — SIGNIFICANT CHANGE UP (ref 32–36)
MCV RBC AUTO: 94.5 FL — SIGNIFICANT CHANGE UP (ref 80–100)
NRBC # BLD: 0 /100 WBCS — SIGNIFICANT CHANGE UP (ref 0–0)
PLATELET # BLD AUTO: 230 K/UL — SIGNIFICANT CHANGE UP (ref 150–400)
POTASSIUM SERPL-MCNC: 3.6 MMOL/L — SIGNIFICANT CHANGE UP (ref 3.5–5.3)
POTASSIUM SERPL-SCNC: 3.6 MMOL/L — SIGNIFICANT CHANGE UP (ref 3.5–5.3)
RBC # BLD: 4.53 M/UL — SIGNIFICANT CHANGE UP (ref 3.8–5.2)
RBC # FLD: 13.3 % — SIGNIFICANT CHANGE UP (ref 10.3–14.5)
SODIUM SERPL-SCNC: 137 MMOL/L — SIGNIFICANT CHANGE UP (ref 135–145)
WBC # BLD: 7.5 K/UL — SIGNIFICANT CHANGE UP (ref 3.8–10.5)
WBC # FLD AUTO: 7.5 K/UL — SIGNIFICANT CHANGE UP (ref 3.8–10.5)

## 2021-01-29 PROCEDURE — G0463: CPT

## 2021-01-29 RX ORDER — FERROUS SULFATE 325(65) MG
1 TABLET ORAL
Qty: 0 | Refills: 0 | DISCHARGE

## 2021-01-29 NOTE — H&P PST ADULT - NSICDXFAMILYHX_GEN_ALL_CORE_FT
FAMILY HISTORY:  FH: heart attack, , in  grandfather  FH: hypertension, , in fatherdiabetes  FH: type 2 diabetes, , in grandmother

## 2021-01-29 NOTE — H&P PST ADULT - ASSESSMENT
34 yo female is scheduled for :  Laparoscopic Tubal Ligation, on 2/9/21   36 yo female is scheduled for :  Laparoscopic Tubal Ligation, on 2/9/21

## 2021-01-29 NOTE — H&P PST ADULT - HISTORY OF PRESENT ILLNESS
36 yo female with no significant PMHx, Postpartum x 2 months, reports the above.  She is scheduled for :  Laparoscopic Tubal Ligation, on 2/9/21

## 2021-01-29 NOTE — H&P PST ADULT - NSANTHOSAYNRD_GEN_A_CORE
No. MARYANA screening performed.  STOP BANG Legend: 0-2 = LOW Risk; 3-4 = INTERMEDIATE Risk; 5-8 = HIGH Risk

## 2021-01-29 NOTE — H&P PST ADULT - NSICDXPROBLEM_GEN_ALL_CORE_FT
PROBLEM DIAGNOSES  Problem: Encounter for sterilization  Assessment and Plan:  Laparoscopic Tubal Ligation      Problem: Need for prophylactic measure  Assessment and Plan: Patient is instructed to take two showers before coming for the procedure.  First shower with her regular soap, the second shower with chlorhexidine soap given.  She is told to use the second soap from her neck to her toes, to avoid her head, face, genital, rinse aftewr each soap, wear clean clothes and come to the hospital.  Patient verbalized understanding.  Literature is also given.  She is encouraged to read it

## 2021-02-06 ENCOUNTER — APPOINTMENT (OUTPATIENT)
Dept: DISASTER EMERGENCY | Facility: CLINIC | Age: 36
End: 2021-02-06

## 2021-02-06 DIAGNOSIS — Z01.818 ENCOUNTER FOR OTHER PREPROCEDURAL EXAMINATION: ICD-10-CM

## 2021-02-07 LAB — SARS-COV-2 N GENE NPH QL NAA+PROBE: NOT DETECTED

## 2021-02-08 ENCOUNTER — TRANSCRIPTION ENCOUNTER (OUTPATIENT)
Age: 36
End: 2021-02-08

## 2021-02-09 ENCOUNTER — OUTPATIENT (OUTPATIENT)
Dept: OUTPATIENT SERVICES | Facility: HOSPITAL | Age: 36
LOS: 1 days | Discharge: HOME | End: 2021-02-09
Payer: MEDICAID

## 2021-02-09 ENCOUNTER — INPATIENT (INPATIENT)
Facility: HOSPITAL | Age: 36
LOS: 0 days | Discharge: HOME | DRG: 745 | End: 2021-02-09
Attending: OBSTETRICS & GYNECOLOGY | Admitting: OBSTETRICS & GYNECOLOGY
Payer: MEDICAID

## 2021-02-09 VITALS
RESPIRATION RATE: 15 BRPM | OXYGEN SATURATION: 100 % | HEART RATE: 63 BPM | DIASTOLIC BLOOD PRESSURE: 56 MMHG | SYSTOLIC BLOOD PRESSURE: 96 MMHG

## 2021-02-09 VITALS
DIASTOLIC BLOOD PRESSURE: 57 MMHG | SYSTOLIC BLOOD PRESSURE: 91 MMHG | RESPIRATION RATE: 16 BRPM | HEART RATE: 73 BPM | OXYGEN SATURATION: 99 % | WEIGHT: 117.95 LBS | HEIGHT: 64 IN | TEMPERATURE: 98 F

## 2021-02-09 DIAGNOSIS — Z30.2 ENCOUNTER FOR STERILIZATION: ICD-10-CM

## 2021-02-09 LAB
BLD GP AB SCN SERPL QL: SIGNIFICANT CHANGE UP
HCG UR QL: NEGATIVE — SIGNIFICANT CHANGE UP

## 2021-02-09 PROCEDURE — 81025 URINE PREGNANCY TEST: CPT

## 2021-02-09 PROCEDURE — 58670 LAPAROSCOPY TUBAL CAUTERY: CPT

## 2021-02-09 PROCEDURE — 36415 COLL VENOUS BLD VENIPUNCTURE: CPT

## 2021-02-09 PROCEDURE — 86900 BLOOD TYPING SEROLOGIC ABO: CPT

## 2021-02-09 PROCEDURE — 86901 BLOOD TYPING SEROLOGIC RH(D): CPT

## 2021-02-09 PROCEDURE — 86850 RBC ANTIBODY SCREEN: CPT

## 2021-02-09 RX ORDER — ONDANSETRON 8 MG/1
4 TABLET, FILM COATED ORAL ONCE
Refills: 0 | Status: DISCONTINUED | OUTPATIENT
Start: 2021-02-09 | End: 2021-02-09

## 2021-02-09 RX ORDER — ACETAMINOPHEN 500 MG
3 TABLET ORAL
Qty: 0 | Refills: 0 | DISCHARGE

## 2021-02-09 RX ORDER — SODIUM CHLORIDE 9 MG/ML
3 INJECTION INTRAMUSCULAR; INTRAVENOUS; SUBCUTANEOUS EVERY 8 HOURS
Refills: 0 | Status: DISCONTINUED | OUTPATIENT
Start: 2021-02-09 | End: 2021-02-09

## 2021-02-09 RX ORDER — IBUPROFEN 200 MG
3 TABLET ORAL
Qty: 0 | Refills: 0 | DISCHARGE

## 2021-02-09 RX ORDER — HYDROMORPHONE HYDROCHLORIDE 2 MG/ML
0.5 INJECTION INTRAMUSCULAR; INTRAVENOUS; SUBCUTANEOUS
Refills: 0 | Status: DISCONTINUED | OUTPATIENT
Start: 2021-02-09 | End: 2021-02-09

## 2021-02-09 RX ORDER — SODIUM CHLORIDE 9 MG/ML
1000 INJECTION, SOLUTION INTRAVENOUS
Refills: 0 | Status: DISCONTINUED | OUTPATIENT
Start: 2021-02-09 | End: 2021-02-09

## 2021-02-09 RX ADMIN — SODIUM CHLORIDE 3 MILLILITER(S): 9 INJECTION INTRAMUSCULAR; INTRAVENOUS; SUBCUTANEOUS at 07:16

## 2021-02-09 NOTE — ASU DISCHARGE PLAN (ADULT/PEDIATRIC) - CALL YOUR DOCTOR IF YOU HAVE ANY OF THE FOLLOWING:
Bleeding that does not stop/Swelling that gets worse/Pain not relieved by Medications/Fever greater than (need to indicate Fahrenheit or Celsius)/Wound/Surgical Site with redness, or foul smelling discharge or pus/Nausea and vomiting that does not stop/Unable to urinate/Inability to tolerate liquids or foods

## 2021-02-09 NOTE — ASU PREOP CHECKLIST - 1.
pt. is breastfeeding--will let anesthesiologist know preop; pt. instructed to discard the milk 24hr. post sx.---verbalized understanding.

## 2021-02-09 NOTE — ASU DISCHARGE PLAN (ADULT/PEDIATRIC) - ACTIVITY LEVEL
2 weeks/No excercise/No heavy lifting/No sports/gym/Weight bearing as tolerated/Nothing per vagina/No tub baths/No douching/No tampons/No intercourse

## 2021-02-09 NOTE — ASU DISCHARGE PLAN (ADULT/PEDIATRIC) - CARE PROVIDER_API CALL
Alton Santos  OBSTETRICS AND GYNECOLOGY  [address], [place] [address]  Phone: [phone]  Fax: [phone]  Established Patient  Follow Up Time: 2 weeks

## 2021-02-09 NOTE — BRIEF OPERATIVE NOTE - OPERATION/FINDINGS
EUA: Grossly normal appearing external genitalia. Cervix dilated 1-2cm.  Pelvic survey: Grossly normal appearing uterus, cul-de-sac, bilateral tubes and ovaries. Straight catheterization performed and minimal urine drained.  Bilateral tubal ligation performed without incident. Excellent hemostasis noted at the end of the case.

## 2021-02-16 DIAGNOSIS — Z30.2 ENCOUNTER FOR STERILIZATION: ICD-10-CM

## 2021-02-17 PROBLEM — Z78.9 OTHER SPECIFIED HEALTH STATUS: Chronic | Status: ACTIVE | Noted: 2021-02-08

## 2021-03-05 ENCOUNTER — APPOINTMENT (OUTPATIENT)
Dept: OBGYN | Facility: CLINIC | Age: 36
End: 2021-03-05
Payer: MEDICAID

## 2021-03-05 ENCOUNTER — APPOINTMENT (OUTPATIENT)
Dept: OBGYN | Facility: CLINIC | Age: 36
End: 2021-03-05

## 2021-03-05 VITALS — DIASTOLIC BLOOD PRESSURE: 82 MMHG | BODY MASS INDEX: 20.55 KG/M2 | WEIGHT: 116 LBS | SYSTOLIC BLOOD PRESSURE: 114 MMHG

## 2021-03-05 PROCEDURE — 99024 POSTOP FOLLOW-UP VISIT: CPT

## 2021-03-05 NOTE — HISTORY OF PRESENT ILLNESS
[Pain is well-controlled] : pain is well-controlled [Fever] : no fever [Chills] : no chills [Nausea] : no nausea [Vomiting] : no vomiting [Diarrhea] : no diarrhea [Vaginal Bleeding] : no vaginal bleeding [Pelvic Pressure] : no pelvic pressure [Dysuria] : no dysuria [Vaginal Discharge] : no vaginal discharge [Constipation] : no constipation [Clean/Dry/Intact] : clean, dry and intact [Healed] : healed [None] : no vaginal bleeding [Normal] : normal [de-identified] : Patient presents for post op s/p BTL.  No complaints.  Had menses and was normal.  Sutures removed.

## 2021-03-21 ENCOUNTER — LABORATORY RESULT (OUTPATIENT)
Age: 36
End: 2021-03-21

## 2021-03-22 ENCOUNTER — APPOINTMENT (OUTPATIENT)
Dept: OBGYN | Facility: CLINIC | Age: 36
End: 2021-03-22
Payer: MEDICAID

## 2021-03-22 VITALS — DIASTOLIC BLOOD PRESSURE: 72 MMHG | SYSTOLIC BLOOD PRESSURE: 118 MMHG | BODY MASS INDEX: 20.55 KG/M2 | WEIGHT: 116 LBS

## 2021-03-22 PROCEDURE — 99395 PREV VISIT EST AGE 18-39: CPT

## 2021-03-22 PROCEDURE — 99072 ADDL SUPL MATRL&STAF TM PHE: CPT

## 2021-03-22 NOTE — HISTORY OF PRESENT ILLNESS
[FreeTextEntry1] : here for annual\par no complaints \par doing well after delivery and btl \par she is breastfeeding

## 2021-06-19 NOTE — H&P PST ADULT - NEGATIVE FEMALE-SPECIFIC SYMPTOMS
Pt arrives ambulatory with CC of mental health problem. Per pt, he arrived by . He is unable to state why he is here but says he got in an argument with his parents. He is a poor historian and difficult to understand what is upsetting him. Pt is laughing and crying in triage, he pulled BP cuff and pulse ox off and threw them off himself.
no vaginal discharge

## 2022-01-05 NOTE — ED PROVIDER NOTE - RIGHT EYE:     20/
Tiffany BAER Dee  1949  Consuelo Douglas MD  [id number]     Visit Note Report      REASON FOR VISIT:  Chief Complaint   Patient presents with   • Follow-up     6 month, lab november    • MEDICATION ISSUE     change in insurance             HISTORY OF PRESENT ILLNESS:   The patient is a 72 year old female, a patient of Consuelo Douglas MD, come in for the above mentioned problems.    1- postmenopausal syndrome- currently on estrogen replacement treatment.  Patient is requesting to be continued on the medication.  Patient denies any hot flashes or night sweats.  2- pituitary adenoma- currently on Dostinex.  Patient continues to have headaches which he attributes to migraine headaches.  Patient is not interested in seeing an endocrinologist  3- migraine headaches- patient has noticed increased migraine headaches recently.  Currently on Imitrex which he states works when she takes it but has been had getting 2-3 episodes per week.  Patient is established with Neurology.  4- multiple sclerosis- currently on multiple medication including Mayzent, oxybutynin, gabapentin and baclofen.  Patient is established with Neurology.  5- dyslipidemia- currently not on medication.  Patient is on strict low-cholesterol diet unable to exercise secondary to multiple sclerosis and associated gait disturbance.  Patient is due for fasting labs    REVIEW OF SYSTEMS:  GENERAL:  Gives no history of fevers, chills or rigors.  CARDIOVASCULAR:  No complaints of chest pain or palpitations.  RESPIRATORY:  No complaints of shortness of breath, cough or sputum production.  GI:  No complaints of abdominal pain, nausea, vomiting, diarrhea or constipation.   Additional pertinent positives and negatives in history of present illness ,Rest of the review of systems reviewed and found to be negative.    PAST MEDICAL HISTORY  Past Medical History:   Diagnosis Date   • Bronchitis    • Cataract 8/3/2015   • Disorder of bone and cartilage, unspecified 01/25/2011    • Dyslipidemia, goal LDL below 100 8/7/2018   • HTN (hypertension)     denied by pt/ no meds   • Kidney stone    • Migraines    • Multiple sclerosis (CMS/HCC)    • Optic atrophy     Right   • Other chronic pain     neck   • Pituitary adenoma (CMS/HCC)    • Postmenopausal disorder 10/14/2016   • Prolactinoma (CMS/HCC)    • Sinusitis, chronic    • Trigeminal neuralgia    • Urinary incontinence        ALLERGIES  ALLERGIES:   Allergen Reactions   • Penicillins HIVES   • Sulfa Drugs Cross Reactors PRURITUS       CURRENT MEDICATIONS  Current Outpatient Medications   Medication Sig Dispense Refill   • sumatriptan (IMITREX) 100 MG tablet Take 1 tablet by mouth at onset of migraine. May repeat after 2 hours if needed. 30 tablet 3   • estrogens, conjugated (Premarin) 0.3 MG tablet Take 1 tablet by mouth daily. 90 tablet 3   • Siponimod Fumarate (Mayzent) 2 MG Tab Take 2 mg by mouth daily. 90 tablet 3   • gabapentin (NEURONTIN) 600 MG tablet Take 1 tablet by mouth 4 times daily. 360 tablet 3   • baclofen (LIORESAL) 10 MG tablet TAKE 2 TABLETS BY MOUTH 3 TIMES DAILY. 540 tablet 1   • oxybutynin (DITROPAN-XL) 10 MG 24 hr tablet TAKE 1 TABLET EVERY DAY 90 tablet 1   • acetaminophen (TYLENOL) 325 MG tablet Take 2 tablets by mouth every 6 hours as needed for Pain. 100 tablet 0   • magnesium hydroxide (MILK OF MAGNESIA) 400 MG/5ML suspension Take 5 mLs by mouth daily as needed for Constipation. 360 mL 0   • Biotin 48703 MCG TABLET DISPERSIBLE Take 1 tablet by mouth daily.     • Cholecalciferol (VITAMIN D3) 5000 units Tab Take 1 tablet by mouth daily.     • aspirin-acetaminophen-caffeine (EXCEDRIN MIGRAINE) 250-250-65 MG per tablet Take 1 tablet by mouth every 6 hours as needed for Pain.      • [START ON 1/6/2022] cabergoline (DOSTINEX) 0.5 MG tablet Take 1 tablet by mouth 2 days a week. 26 tablet 1     No current facility-administered medications for this visit.       SOCIAL HISTORY  Social History     Tobacco Use   • Smoking  status: Never Smoker   • Smokeless tobacco: Never Used   Substance Use Topics   • Alcohol use: Yes     Alcohol/week: 1.0 standard drink     Types: 1 Standard drinks or equivalent per week     Comment: Rare   • Drug use: No       FAMILY HISTORY  Family History   Problem Relation Age of Onset   • Cancer, Lung Mother    • Arthritis Mother    • Thyroid Mother    • Cataracts Mother    • Diabetes Father    • Hypertension Father    • Stroke Father    • Heart disease Sister        PHYSICAL EXAMINATION:  VITALS:   Visit Vitals  /82   Pulse 71   Resp 16   Ht 5' 4.5\" (1.638 m)   Wt 62.6 kg (138 lb)   SpO2 98%   BMI 23.32 kg/m²     GENERAL:  Pt is awake, alert, oriented x 3, not in any apparent distress.  No dyspnea,no distress present.  HEENT:   PERRLA.  Conjunctivae show no icterus.  Throat shows moist mucous membranes.  No erythema or congestion present.  NECK:  Supple.  No JVD, no lymphadenopathy present.  CARDIOVASCULAR SYSTEM:  S1, S2 heard, regular rate and rhythm.  No murmur.  RESPIRATORY SYSTEM:  Lungs clear to auscultation.  No rhonchi or wheezing heard.  Bilateral air entry good.  GASTROINTESTINAL SYSTEM:  Abdomen soft.  Bowel sounds heard.  No tenderness.  No hepatomegaly or splenomegaly present.  MUSCULOSKELETAL:  Bilateral extremities show no signs of edema, cyanosis or clubbing present.    Lab Services on 11/16/2021   Component Date Value Ref Range Status   • Prolactin 11/16/2021 20.7  2.8 - 29.2 ng/mL Final   • GPT/ALT 11/16/2021 21  <64 Units/L Final   • WBC 11/16/2021 2.8* 4.2 - 11.0 K/mcL Final   • RBC 11/16/2021 4.24  4.00 - 5.20 mil/mcL Final   • HGB 11/16/2021 12.1  12.0 - 15.5 g/dL Final   • HCT 11/16/2021 37.9  36.0 - 46.5 % Final   • MCV 11/16/2021 89.4  78.0 - 100.0 fl Final   • MCH 11/16/2021 28.5  26.0 - 34.0 pg Final   • MCHC 11/16/2021 31.9* 32.0 - 36.5 g/dL Final   • RDW-CV 11/16/2021 13.7  11.0 - 15.0 % Final   • RDW-SD 11/16/2021 44.9  39.0 - 50.0 fL Final   • PLT 11/16/2021 242  140 - 450  K/mcL Final   • NRBC 11/16/2021 0  <=0 /100 WBC Final   • Neutrophil, Percent 11/16/2021 70  % Final   • Lymphocytes, Percent 11/16/2021 10  % Final   • Mono, Percent 11/16/2021 17  % Final   • Eosinophils, Percent 11/16/2021 2  % Final   • Basophils, Percent 11/16/2021 1  % Final   • Immature Granulocytes 11/16/2021 0  % Final   • Absolute Neutrophils 11/16/2021 1.9  1.8 - 7.7 K/mcL Final   • Absolute Lymphocytes 11/16/2021 0.3* 1.0 - 4.0 K/mcL Final   • Absolute Monocytes 11/16/2021 0.5  0.3 - 0.9 K/mcL Final   • Absolute Eosinophils  11/16/2021 0.1  0.0 - 0.5 K/mcL Final   • Absolute Basophils 11/16/2021 0.0  0.0 - 0.3 K/mcL Final   • Absolute Immmature Granulocytes 11/16/2021 0.0  0.0 - 0.2 K/mcL Final       ASSESSMENT:  1. Dyslipidemia, goal LDL below 100    2. Pituitary adenoma (CMS/HCC)    3. Multiple sclerosis (CMS/[facility])    4. Migraine with aura and without status migrainosus, not intractable    5. Gait disturbance    6. Encounter for long-term (current) use of high-risk medication    7. Leukopenia, unspecified type    8. Postmenopausal disorder      1- dyslipidemia- repeat fasting cholesterol today.  Continue on low-cholesterol diet.  2- pituitary adenoma- prolactin level within normal limits.  Unknown if patient has frequent headaches or related to pituitary adenoma, migraine or multiple sclerosis.  Patient was given option of seeing an endocrinologist which he declined and has been recommended to give us a call back if she changes her mind.  Continue on current dose of Dostinex.  3- multiple sclerosis- worsening gait disturbance with multiple falls.  Patient was strongly recommended to start using walker which he thinks she does not need at this time.  Patient is being referred to physical therapy.  Continue with Neurology.  4- migraine- uncontrolled symptoms with frequent exacerbation which responds to Imitrex continue on Imitrex on as needed. .  Patient has been strongly recommended to discuss her  increasing migraine headaches with Neurology during her next upcoming appointment in 1 month.  Patient has verbalized understanding to this.  5- leukopenia- repeat CBC today.  Follow-up if patient notices any signs of infection.  6- postmenopausal disorder- symptoms well controlled on current dose of estrogen continue same.  Patient is aware of the side effects of the medication including increased risk of cerebrovascular accident, breast cancer and endometrial cancer      PLAN:  Orders Placed This Encounter   • CBC with Automated Differential   • Comprehensive Metabolic Panel   • Lipid Panel With Reflex   • Lipid Panel With Reflex   • Comprehensive Metabolic Panel   • CBC with Automated Differential   • SERVICE TO PHYSICAL THERAPY   • sumatriptan (IMITREX) 100 MG tablet   • estrogens, conjugated (Premarin) 0.3 MG tablet   • cabergoline (DOSTINEX) 0.5 MG tablet       Return for mwv 3 days after labs..     20

## 2022-03-24 ENCOUNTER — APPOINTMENT (OUTPATIENT)
Dept: OBGYN | Facility: CLINIC | Age: 37
End: 2022-03-24
Payer: MEDICAID

## 2022-03-24 ENCOUNTER — LABORATORY RESULT (OUTPATIENT)
Age: 37
End: 2022-03-24

## 2022-03-24 VITALS
SYSTOLIC BLOOD PRESSURE: 114 MMHG | OXYGEN SATURATION: 98 % | WEIGHT: 113 LBS | HEART RATE: 84 BPM | BODY MASS INDEX: 20.02 KG/M2 | DIASTOLIC BLOOD PRESSURE: 80 MMHG

## 2022-03-24 PROCEDURE — 99395 PREV VISIT EST AGE 18-39: CPT

## 2022-04-16 NOTE — H&P PST ADULT - BMI (KG/M2)
Contacted [employer] and spoke with [facility] regarding life vest interrogation. PT needs battery charger hot spot, and batteries. PT called his niece to bring up his life vest supplies and other things for the evening. [employer] has notes regarding PT, need to call back for further instructions for \"downloading\" from PT monitor.  [address] Spencer RN  04/15/22 2034 20.2

## 2022-07-13 ENCOUNTER — APPOINTMENT (OUTPATIENT)
Dept: OBGYN | Facility: CLINIC | Age: 37
End: 2022-07-13

## 2022-07-13 VITALS
DIASTOLIC BLOOD PRESSURE: 86 MMHG | BODY MASS INDEX: 20.55 KG/M2 | SYSTOLIC BLOOD PRESSURE: 119 MMHG | HEART RATE: 82 BPM | OXYGEN SATURATION: 99 % | WEIGHT: 116 LBS

## 2022-07-13 PROCEDURE — 99212 OFFICE O/P EST SF 10 MIN: CPT

## 2022-07-13 NOTE — HISTORY OF PRESENT ILLNESS
[FreeTextEntry1] : Patient is a 37 year old female who presents with concern for yeast infection.  States that she has thick white discharge and irritation after being in the pool.  + yeast infection.  Up to date on pap.

## 2022-07-13 NOTE — PHYSICAL EXAM
[Appropriately responsive] : appropriately responsive [Alert] : alert [No Acute Distress] : no acute distress [No Lymphadenopathy] : no lymphadenopathy [Regular Rate Rhythm] : regular rate rhythm [No Murmurs] : no murmurs [Clear to Auscultation B/L] : clear to auscultation bilaterally [Soft] : soft [Non-tender] : non-tender [Non-distended] : non-distended [No HSM] : No HSM [No Lesions] : no lesions [No Mass] : no mass [Oriented x3] : oriented x3 [Examination Of The Breasts] : a normal appearance [No Masses] : no breast masses were palpable [Labia Majora] : normal [Labia Minora] : normal [Normal] : normal [Uterine Adnexae] : normal [FreeTextEntry4] : + inflammation and yeast

## 2022-09-12 ENCOUNTER — APPOINTMENT (OUTPATIENT)
Dept: OBGYN | Facility: CLINIC | Age: 37
End: 2022-09-12

## 2022-09-12 VITALS — SYSTOLIC BLOOD PRESSURE: 117 MMHG | WEIGHT: 113 LBS | BODY MASS INDEX: 20.02 KG/M2 | DIASTOLIC BLOOD PRESSURE: 76 MMHG

## 2022-09-12 PROCEDURE — 99213 OFFICE O/P EST LOW 20 MIN: CPT

## 2022-09-12 RX ORDER — TERCONAZOLE 8 MG/G
0.8 CREAM VAGINAL
Qty: 1 | Refills: 0 | Status: ACTIVE | COMMUNITY
Start: 2022-09-12 | End: 1900-01-01

## 2022-09-16 LAB
CANDIDA VAG CYTO: DETECTED
G VAGINALIS+PREV SP MTYP VAG QL MICRO: DETECTED
T VAGINALIS VAG QL WET PREP: NOT DETECTED

## 2022-09-16 RX ORDER — METRONIDAZOLE 500 MG/1
500 TABLET ORAL TWICE DAILY
Qty: 14 | Refills: 0 | Status: ACTIVE | COMMUNITY
Start: 2022-09-16 | End: 1900-01-01

## 2023-05-15 ENCOUNTER — LABORATORY RESULT (OUTPATIENT)
Age: 38
End: 2023-05-15

## 2023-05-15 ENCOUNTER — APPOINTMENT (OUTPATIENT)
Dept: OBGYN | Facility: CLINIC | Age: 38
End: 2023-05-15
Payer: MEDICAID

## 2023-05-15 VITALS
DIASTOLIC BLOOD PRESSURE: 71 MMHG | OXYGEN SATURATION: 98 % | HEART RATE: 88 BPM | WEIGHT: 116 LBS | SYSTOLIC BLOOD PRESSURE: 108 MMHG | BODY MASS INDEX: 20.55 KG/M2

## 2023-05-15 PROCEDURE — 99395 PREV VISIT EST AGE 18-39: CPT

## 2023-05-15 NOTE — HISTORY OF PRESENT ILLNESS
[FreeTextEntry1] : here for annual\par complains of vaginal discharge and recurrent infections \par hx btl

## 2023-05-17 ENCOUNTER — TRANSCRIPTION ENCOUNTER (OUTPATIENT)
Age: 38
End: 2023-05-17

## 2023-05-17 RX ORDER — METRONIDAZOLE 7.5 MG/G
0.75 GEL VAGINAL
Qty: 1 | Refills: 0 | Status: ACTIVE | COMMUNITY
Start: 2023-05-17 | End: 1900-01-01

## 2023-06-20 NOTE — PATIENT PROFILE OB - NUTRITION PROFILE
Caller: Patients mom, Georgia Green    Doctor: hand specialist    Reason for call: Mom is calling to set appointment with hand surgeon to remove a wood splinter in right hand / thumb area  She had him go to  The PCP and she tried to remove it, but was unsuccessful removing all of it and now believe scar tissue is growing over it  She is going to get to get xrays tonight and will bring copy along to appointment      Call back#: [phone]
No indicators present

## 2023-08-15 ENCOUNTER — APPOINTMENT (OUTPATIENT)
Dept: OBGYN | Facility: CLINIC | Age: 38
End: 2023-08-15
Payer: MEDICAID

## 2023-08-15 VITALS
OXYGEN SATURATION: 99 % | DIASTOLIC BLOOD PRESSURE: 80 MMHG | WEIGHT: 116 LBS | SYSTOLIC BLOOD PRESSURE: 114 MMHG | HEART RATE: 81 BPM | BODY MASS INDEX: 20.55 KG/M2

## 2023-08-15 DIAGNOSIS — B37.31 ACUTE CANDIDIASIS OF VULVA AND VAGINA: ICD-10-CM

## 2023-08-15 PROCEDURE — 99213 OFFICE O/P EST LOW 20 MIN: CPT

## 2023-08-15 RX ORDER — TERCONAZOLE 8 MG/G
0.8 CREAM VAGINAL
Qty: 1 | Refills: 0 | Status: ACTIVE | COMMUNITY
Start: 2023-08-15 | End: 1900-01-01

## 2023-08-29 ENCOUNTER — APPOINTMENT (OUTPATIENT)
Dept: OBGYN | Facility: CLINIC | Age: 38
End: 2023-08-29
Payer: MEDICAID

## 2023-08-29 VITALS
BODY MASS INDEX: 20.55 KG/M2 | SYSTOLIC BLOOD PRESSURE: 102 MMHG | OXYGEN SATURATION: 100 % | WEIGHT: 116 LBS | HEART RATE: 74 BPM | DIASTOLIC BLOOD PRESSURE: 70 MMHG

## 2023-08-29 DIAGNOSIS — Z87.42 PERSONAL HISTORY OF OTHER DISEASES OF THE FEMALE GENITAL TRACT: ICD-10-CM

## 2023-08-29 PROCEDURE — 99212 OFFICE O/P EST SF 10 MIN: CPT

## 2023-08-29 NOTE — PHYSICAL EXAM
[Appropriately responsive] : appropriately responsive [Alert] : alert [No Acute Distress] : no acute distress [Soft] : soft [Labia Majora] : normal [Labia Minora] : normal [Normal] : normal [FreeTextEntry4] : no vaginal discharge noted

## 2023-08-29 NOTE — HISTORY OF PRESENT ILLNESS
[FreeTextEntry1] : Patient was previously seen with c/o vaginal itching and discharge. Patient reports use of terazol cream and fluconazole with resolution. Patient reports feeling well after taking probiotics and desires to continue taking them. No other complaints. Patient reports a remote history of abnormal pap smears at the age of 23 with subsequent colposcopy and LEEP. Patient reports complete resolution by 28yoa.  [Patient reported PAP Smear was abnormal] : Patient reported PAP Smear was abnormal [PapSmeardate] : 01/08 [TextBox_31] : (2008) s/p colposcopy s/p LEEP in [facility], subsequent pap smears normal

## 2023-12-05 ENCOUNTER — APPOINTMENT (OUTPATIENT)
Dept: OBGYN | Facility: CLINIC | Age: 38
End: 2023-12-05
Payer: COMMERCIAL

## 2023-12-05 VITALS
WEIGHT: 115 LBS | BODY MASS INDEX: 20.37 KG/M2 | OXYGEN SATURATION: 99 % | HEART RATE: 106 BPM | SYSTOLIC BLOOD PRESSURE: 121 MMHG | DIASTOLIC BLOOD PRESSURE: 76 MMHG

## 2023-12-05 DIAGNOSIS — R10.2 PELVIC AND PERINEAL PAIN: ICD-10-CM

## 2023-12-05 PROCEDURE — 99213 OFFICE O/P EST LOW 20 MIN: CPT

## 2023-12-07 ENCOUNTER — APPOINTMENT (OUTPATIENT)
Dept: OBGYN | Facility: CLINIC | Age: 38
End: 2023-12-07

## 2023-12-08 PROBLEM — R10.2 PELVIC PAIN: Status: ACTIVE | Noted: 2023-12-08

## 2023-12-15 ENCOUNTER — ASOB RESULT (OUTPATIENT)
Age: 38
End: 2023-12-15

## 2023-12-15 ENCOUNTER — APPOINTMENT (OUTPATIENT)
Dept: OBGYN | Facility: CLINIC | Age: 38
End: 2023-12-15
Payer: COMMERCIAL

## 2023-12-15 PROCEDURE — 76830 TRANSVAGINAL US NON-OB: CPT

## 2023-12-15 RX ORDER — FLUCONAZOLE 150 MG/1
150 TABLET ORAL
Qty: 3 | Refills: 1 | Status: ACTIVE | OUTPATIENT
Start: 2022-09-12

## 2023-12-30 NOTE — PATIENT PROFILE OB - BREASTFEEDING PROVIDES STABLE TEMPERATURE THROUGH SKIN TO SKIN CONTACT
Maeve,   We need help with this complex patient.  I placed a referral.  Bio mom and her partners numbers are under the blue post it tab.    Cynthia,   Can you add bio mom and her partner Harpreet's number to the demographics? Can you call the family on Tues and get them on [facility]'s schedule ASAP?  If anyi does not have openings next week, please ask her to try and fit them in, if she can't fit them in, let me know.      Anyi,  We need to try to get this family in [facility] for diabetes education.    Kassi and Dr Pool,   This is just an FYI in case they call.       Chelita Tatum (the RN in the office) received a call from bio mom (Janice) and her partner (Dc) at 2:20pm today.  Meryari has been in their care since 12/20/23 despite neither mom nor her partner receiving diabetes education.      Chelita tried to call the family back but was not able to get a hold of the family.    I have called the bio mom and her partner multiple times with no answer and left a voicemail on mom's cell telling her to have Yoli teach her how to give glucagon-which is given if she has a low blood sugar with altered mental status or seizures.  I told her if she has any high blood sugars over 300 ketones must be checked.  Any moderate or large ketones, will need an ER visit.  Any vomiting and they must take her to the ER.      I called around 2:30pm and filled a report with CPS with the intake person in [place], [place] where the original report was filed ([phone]). I was told she still has an open case. I then called her CPS , Gloria Sanchez ([phone]) and spoke with her.  Gloria states she was aware the patient was with bio mom.  I expressed my concerns about her safety because mom has not had diabetes education.  I explained the risk of death from untreated hypoglycemia or DKA.  She asked if I could provide mom with this information over the phone.  I explained we have been unable to get a hold of the patient's  mother and therefore we can not give her even basic education.  Gloria stated there is nothing she can do since the patient is no longer in [place]. She stated the parents still had custody and could take her.  I asked why she did not have a safety plan in plan and Gloria just stated that she did not have a safety plan to prevent her from living with mom who has not had diabetes education.      I then attempted to call mom and her boyfriend multiple times between 2:30pm and 4:30pm (Step dad number [phone]; Bio mom's number [phone]) but could only leave voicemail.      At 4:45pm I called [facility] and explained the situation to the intake .  Rd: Kid lived in [place], has an open CPS case, high risk diabetic in an unsafe home due to lack of parental education about diabetes, at risk of death or disability due to parents not know how to recognize or treat hypoglycemia of ketosis.  She stated that she could not open a case because the patient resided in [place] and has an open case here.      I then called Gloria Sanchez back and left voicemail that [facility] will not open a case but stated she needed to call [facility] and ask for a courtesy visit.      I then call [facility] intake  ([phone]) and I spoke to the  that answered the phone and told her that they needed to call [facility] and ask for a courtesy visit.  She stated she would add it to the report for the supervisor to review.    I provided her with my cell number in case they had questions as there is no on call endocrinologist this weekend.       Statement Selected

## 2024-05-23 ENCOUNTER — APPOINTMENT (OUTPATIENT)
Dept: OBGYN | Facility: CLINIC | Age: 39
End: 2024-05-23
Payer: COMMERCIAL

## 2024-05-23 ENCOUNTER — LABORATORY RESULT (OUTPATIENT)
Age: 39
End: 2024-05-23

## 2024-05-23 VITALS
OXYGEN SATURATION: 100 % | WEIGHT: 113 LBS | SYSTOLIC BLOOD PRESSURE: 106 MMHG | BODY MASS INDEX: 20.02 KG/M2 | HEART RATE: 86 BPM | DIASTOLIC BLOOD PRESSURE: 69 MMHG

## 2024-05-23 DIAGNOSIS — B37.31 ACUTE CANDIDIASIS OF VULVA AND VAGINA: ICD-10-CM

## 2024-05-23 DIAGNOSIS — Z01.419 ENCOUNTER FOR GYNECOLOGICAL EXAMINATION (GENERAL) (ROUTINE) W/OUT ABNORMAL FINDINGS: ICD-10-CM

## 2024-05-23 PROCEDURE — 99395 PREV VISIT EST AGE 18-39: CPT

## 2024-05-23 RX ORDER — FLUCONAZOLE 150 MG/1
150 TABLET ORAL
Qty: 3 | Refills: 1 | Status: ACTIVE | COMMUNITY
Start: 2023-08-15 | End: 1900-01-01

## 2024-05-23 RX ORDER — BETAMETHASONE DIPROPIONATE 0.5 MG/G
0.05 CREAM TOPICAL
Qty: 15 | Refills: 0 | Status: ACTIVE | COMMUNITY
Start: 2024-05-23 | End: 1900-01-01

## 2024-05-23 RX ORDER — FLUCONAZOLE 150 MG/1
150 TABLET ORAL
Qty: 16 | Refills: 0 | Status: ACTIVE | OUTPATIENT
Start: 2022-07-13

## 2024-05-26 PROBLEM — B37.31 YEAST INFECTION OF THE VAGINA: Status: ACTIVE | Noted: 2022-07-13

## 2024-05-26 PROBLEM — Z01.419 WELL WOMAN EXAM: Status: ACTIVE | Noted: 2019-09-10

## 2024-05-28 LAB
A VAGINAE DNA VAG QL NAA+PROBE: NORMAL
BVAB2 DNA VAG QL NAA+PROBE: NORMAL
C KRUSEI DNA VAG QL NAA+PROBE: NEGATIVE
C KRUSEI DNA VAG QL NAA+PROBE: POSITIVE
C TRACH RRNA SPEC QL NAA+PROBE: NEGATIVE
CANDIDA DNA VAG QL NAA+PROBE: NEGATIVE
MEGA1 DNA VAG QL NAA+PROBE: NORMAL
N GONORRHOEA RRNA SPEC QL NAA+PROBE: NEGATIVE
T VAGINALIS RRNA SPEC QL NAA+PROBE: NEGATIVE

## 2025-06-03 ENCOUNTER — APPOINTMENT (OUTPATIENT)
Dept: OBGYN | Facility: CLINIC | Age: 40
End: 2025-06-03

## 2025-09-16 ENCOUNTER — LABORATORY RESULT (OUTPATIENT)
Age: 40
End: 2025-09-16

## 2025-09-16 ENCOUNTER — APPOINTMENT (OUTPATIENT)
Dept: OBGYN | Facility: CLINIC | Age: 40
End: 2025-09-16
Payer: MEDICAID

## 2025-09-16 VITALS — BODY MASS INDEX: 20.55 KG/M2 | DIASTOLIC BLOOD PRESSURE: 72 MMHG | WEIGHT: 116 LBS | SYSTOLIC BLOOD PRESSURE: 105 MMHG

## 2025-09-16 DIAGNOSIS — Z01.419 ENCOUNTER FOR GYNECOLOGICAL EXAMINATION (GENERAL) (ROUTINE) W/OUT ABNORMAL FINDINGS: ICD-10-CM

## 2025-09-16 PROCEDURE — 99395 PREV VISIT EST AGE 18-39: CPT

## 2025-09-16 RX ORDER — FLUCONAZOLE 150 MG/1
150 TABLET ORAL DAILY
Qty: 1 | Refills: 3 | Status: ACTIVE | COMMUNITY
Start: 2025-09-16 | End: 1900-01-01

## 2025-09-21 LAB
A VAGINAE DNA VAG QL NAA+PROBE: ABNORMAL
BVAB2 DNA VAG QL NAA+PROBE: NORMAL
C KRUSEI DNA VAG QL NAA+PROBE: NEGATIVE
C TRACH RRNA SPEC QL NAA+PROBE: NEGATIVE
CANDIDA DNA VAG QL NAA+PROBE: NEGATIVE
MEGA1 DNA VAG QL NAA+PROBE: NORMAL
N GONORRHOEA RRNA SPEC QL NAA+PROBE: NEGATIVE
T VAGINALIS RRNA SPEC QL NAA+PROBE: NEGATIVE